# Patient Record
Sex: MALE | Race: WHITE | NOT HISPANIC OR LATINO | Employment: UNEMPLOYED | URBAN - METROPOLITAN AREA
[De-identification: names, ages, dates, MRNs, and addresses within clinical notes are randomized per-mention and may not be internally consistent; named-entity substitution may affect disease eponyms.]

---

## 2018-07-12 ENCOUNTER — TELEPHONE (OUTPATIENT)
Dept: PLASTIC SURGERY | Facility: CLINIC | Age: 23
End: 2018-07-12

## 2018-07-12 DIAGNOSIS — F64.0 GENDER DYSPHORIA IN ADULT: Primary | ICD-10-CM

## 2018-07-12 NOTE — TELEPHONE ENCOUNTER
Pt called inquiring about top surgery. Pt reports he is on hormones for 6 months with Reese Aguilar at [facility] in [place]; Pt sees Brice at [facility] for therapy who has written pt letter of support; Pt reports Reese will be sending medical records. Pt is looking for top surgery with Dr. Akins for periareolar mastectomy. Pt is in process of changing legal name and gender marker and will bring legal paperwork to his consultation appointment. Pt will bring and have Brice resend letter of support prior to consultation.     Plan: Writer submitted referral for gender care. Pt scheduled in [place] on 8/10/18 at 10am.

## 2018-07-25 ENCOUNTER — HEALTH MAINTENANCE LETTER (OUTPATIENT)
Age: 23
End: 2018-07-25

## 2018-08-02 ENCOUNTER — TRANSFERRED RECORDS (OUTPATIENT)
Dept: HEALTH INFORMATION MANAGEMENT | Facility: CLINIC | Age: 23
End: 2018-08-02

## 2018-08-06 ENCOUNTER — TRANSFERRED RECORDS (OUTPATIENT)
Dept: HEALTH INFORMATION MANAGEMENT | Facility: CLINIC | Age: 23
End: 2018-08-06

## 2018-08-10 ENCOUNTER — OFFICE VISIT (OUTPATIENT)
Dept: SURGERY | Facility: CLINIC | Age: 23
End: 2018-08-10
Attending: PLASTIC SURGERY
Payer: MEDICAID

## 2018-08-10 VITALS — WEIGHT: 216.9 LBS | BODY MASS INDEX: 38.43 KG/M2 | HEIGHT: 63 IN

## 2018-08-10 DIAGNOSIS — F64.0 GENDER DYSPHORIA IN ADULT: Primary | ICD-10-CM

## 2018-08-10 PROCEDURE — 99203 OFFICE O/P NEW LOW 30 MIN: CPT | Performed by: PLASTIC SURGERY

## 2018-08-10 RX ORDER — TESTOSTERONE CYPIONATE 200 MG/ML
50 INJECTION, SOLUTION INTRAMUSCULAR WEEKLY
COMMUNITY

## 2018-08-10 NOTE — PROGRESS NOTES
REFERRING PROVIDER: Quincy Singh    REASON FOR CONSULTATION: Gender dysphoria, requesting top surgery.    HPI: Patient is a 23-year-old trans-man who prefers he him pronouns. He has been considering undergoing top surgery for the past 2-3 years. By undergoing surgery he would like to better align his physical body with his chosen gender identity. He has no history of binding. He transitioned a year ago. He has been on hormones for the past 7 months, prescribed by Reese zimmer. He denies any previous breast medical history.    MEDS:   Prior to Admission medications    Medication Sig Start Date End Date Taking? Authorizing Provider   testosterone cypionate (DEPOTESTOTERONE) 200 MG/ML injection Inject 50 mg into the muscle every 14 days   Yes Reported, Patient       ALLERGIES: None. Allergic to bees.    PMH: Depression and anxiety.    PSH: None.    SH: Denies tobacco use. Works for a temp agency.    FH: Breast cancer, cervical cancer.    ROS: Denies chest pain, shortness of breath, MI, CVA, diabetes, DVT, PE, and bleeding disorders.    PHYSICAL EXAMINATION:  BMI 38.  General: No distress. Appears masculine.  On examination of the chest, patient has bilateral grade 3 ptosis. Pectoralis muscles intact bilaterally. There is scattered acne. Notch to nipple distance is 27 cm bilaterally. Areolar diameter is 5 cm bilaterally. Nipple to fold distance is 13 cm bilaterally. Base diameter is 13 cm bilaterally. There is no palpable breast mass. There is no nipple discharge.    ASSESSMENT: Gender dysphoria, requesting bilateral mastectomy.    PLAN: Patient received a letter of support. We will review this. I splinted the need for a baseline screening mammogram given the family history of breast cancer. Patient is a potential candidate for bilateral mastectomy with free nipple grafting as an outpatient at the surgery center. I explained the procedure in detail. I explained the risks to include bleeding, infection, injury to  surrounding structures, fluid collection, nipple or nipple graft loss, nipple sensory loss, change in nipple size, wound healing difficulties, contour deformity, dog ears, asymmetry, and need for revision surgery. Patient understands there is a higher risk of wound healing difficulties, contour deformity, asymmetry, and dogears with obesity. He accepts these associated risks and wishes to proceed with surgery. He will consider weight loss. We will wait for the mammogram results.    Total time spent with patient was 30 min of which greater than 50% was in counseling.

## 2018-08-10 NOTE — MR AVS SNAPSHOT
After Visit Summary   8/10/2018    Shaila Redd    MRN: [id number]           Patient Information     Date Of Birth          1995        Visit Information        Provider Department      8/10/2018 10:00 AM David Akins MD [facility]        Today's Diagnoses     Gender dysphoria in adult    -  1       Follow-ups after your visit        Who to contact     If you have questions or need follow up information about today's clinic visit or your schedule please contact [facility] directly at [phone].  Normal or non-critical lab and imaging results will be communicated to you by Metconnexhart, letter or phone within 4 business days after the clinic has received the results. If you do not hear from us within 7 days, please contact the clinic through Metconnexhart or phone. If you have a critical or abnormal lab result, we will notify you by phone as soon as possible.  Submit refill requests through Route4Me or call your pharmacy and they will forward the refill request to us. Please allow 3 business days for your refill to be completed.          Additional Information About Your Visit        MyChart Information     Route4Me gives you secure access to your electronic health record. If you see a primary care provider, you can also send messages to your care team and make appointments. If you have questions, please call your primary care clinic.  If you do not have a primary care provider, please call [phone] and they will assist you.      Route4Me is an electronic gateway that provides easy, online access to your medical records. With Route4Me, you can request a clinic appointment, read your test results, renew a prescription or communicate with your care team.     To access your existing account, please contact your [facility] or call [phone] for assistance.        Care EveryWhere ID     This is your Care EveryWhere ID. This could be used by  "other organizations to access your [place] medical records  [id number]        Your Vitals Were     Height BMI (Body Mass Index)                1.6 m (5' 2.99\") 38.43 kg/m2           Blood Pressure from Last 3 Encounters:   No data found for BP    Weight from Last 3 Encounters:   08/10/18 98.4 kg (216 lb 14.4 oz)              Today, you had the following     No orders found for display       Primary Care Provider    Reese Coe NP       No address on file        Equal Access to Services     SHANICE COYLE : Hadii aad ku hadasho Soomaali, waaxda luqadaha, qaybta kaalmada adeegyada, waxay idiin hayaan adeeg kharash la'valentinn . So [facility] [phone].    ATENCIÓN: Si habla español, tiene a land disposición servicios gratuitos de asistencia lingüística. Llame al [phone].    We comply with applicable federal civil rights laws and [place] laws. We do not discriminate on the basis of race, color, national origin, age, disability, sex, sexual orientation, or gender identity.            Thank you!     Thank you for choosing [facility]  for your care. Our goal is always to provide you with excellent care. Hearing back from our patients is one way we can continue to improve our services. Please take a few minutes to complete the written survey that you may receive in the mail after your visit with us. Thank you!             Your Updated Medication List - Protect others around you: Learn how to safely use, store and throw away your medicines at www.disposemymeds.org.          This list is accurate as of 8/10/18 10:32 AM.  Always use your most recent med list.                   Brand Name Dispense Instructions for use Diagnosis    testosterone cypionate 200 MG/ML injection    DEPOTESTOTERONE     Inject 50 mg into the muscle every 14 days          "

## 2018-08-10 NOTE — LETTER
8/10/2018         RE: Shaila Redd  [address]  [address]  [place] [address]        Dear Colleague,    Thank you for referring your patient, Shaila Redd, to the [facility]. Please see a copy of my visit note below.    REFERRING PROVIDER: Quincy Singh    REASON FOR CONSULTATION: Gender dysphoria, requesting top surgery.    HPI: Patient is a 23-year-old trans-man who prefers he him pronouns. He has been considering undergoing top surgery for the past 2-3 years. By undergoing surgery he would like to better align his physical body with his chosen gender identity. He has no history of binding. He transitioned a year ago. He has been on hormones for the past 7 months, prescribed by Reese zimmer. He denies any previous breast medical history.    MEDS:   Prior to Admission medications    Medication Sig Start Date End Date Taking? Authorizing Provider   testosterone cypionate (DEPOTESTOTERONE) 200 MG/ML injection Inject 50 mg into the muscle every 14 days   Yes Reported, Patient       ALLERGIES: None. Allergic to bees.    PMH: Depression and anxiety.    PSH: None.    SH: Denies tobacco use. Works for a temp agency.    FH: Breast cancer, cervical cancer.    ROS: Denies chest pain, shortness of breath, MI, CVA, diabetes, DVT, PE, and bleeding disorders.    PHYSICAL EXAMINATION:  BMI 38.  General: No distress. Appears masculine.  On examination of the chest, patient has bilateral grade 3 ptosis. Pectoralis muscles intact bilaterally. There is scattered acne. Notch to nipple distance is 27 cm bilaterally. Areolar diameter is 5 cm bilaterally. Nipple to fold distance is 13 cm bilaterally. Base diameter is 13 cm bilaterally. There is no palpable breast mass. There is no nipple discharge.    ASSESSMENT: Gender dysphoria, requesting bilateral mastectomy.    PLAN: Patient received a letter of support. We will review this. I splinted the need for a baseline screening mammogram given the family history of  breast cancer. Patient is a potential candidate for bilateral mastectomy with free nipple grafting as an outpatient at the surgery center. I explained the procedure in detail. I explained the risks to include bleeding, infection, injury to surrounding structures, fluid collection, nipple or nipple graft loss, nipple sensory loss, change in nipple size, wound healing difficulties, contour deformity, dog ears, asymmetry, and need for revision surgery. Patient understands there is a higher risk of wound healing difficulties, contour deformity, asymmetry, and dogears with obesity. He accepts these associated risks and wishes to proceed with surgery. He will consider weight loss. We will wait for the mammogram results.    Total time spent with patient was 30 min of which greater than 50% was in counseling.    Again, thank you for allowing me to participate in the care of your patient.        Sincerely,        David Akins MD

## 2018-08-10 NOTE — NURSING NOTE
Shaila Redd's goals for this visit include: top surgery   She requests these members of her care team be copied on today's visit information: no    PCP: Reese Coe    Referring Provider:  Quincy Singh  No address on file    There were no vitals taken for this visit.    Do you need any medication refills at today's visit? No    Eve Ansari LPN

## 2018-08-14 DIAGNOSIS — Z13.9 ENCOUNTER FOR SCREENING, UNSPECIFIED: Primary | ICD-10-CM

## 2018-08-14 DIAGNOSIS — Z12.31 VISIT FOR SCREENING MAMMOGRAM: Primary | ICD-10-CM

## 2018-08-16 ENCOUNTER — TRANSFERRED RECORDS (OUTPATIENT)
Dept: HEALTH INFORMATION MANAGEMENT | Facility: CLINIC | Age: 23
End: 2018-08-16

## 2018-08-28 ENCOUNTER — MEDICAL CORRESPONDENCE (OUTPATIENT)
Dept: HEALTH INFORMATION MANAGEMENT | Facility: CLINIC | Age: 23
End: 2018-08-28

## 2018-09-04 ENCOUNTER — MYC MEDICAL ADVICE (OUTPATIENT)
Dept: PLASTIC SURGERY | Facility: CLINIC | Age: 23
End: 2018-09-04

## 2018-09-07 ENCOUNTER — TELEPHONE (OUTPATIENT)
Dept: SURGERY | Facility: CLINIC | Age: 23
End: 2018-09-07

## 2018-09-18 ENCOUNTER — TELEPHONE (OUTPATIENT)
Dept: SURGERY | Facility: CLINIC | Age: 23
End: 2018-09-18

## 2018-09-27 ENCOUNTER — TELEPHONE (OUTPATIENT)
Dept: SURGERY | Facility: CLINIC | Age: 23
End: 2018-09-27

## 2018-09-27 NOTE — TELEPHONE ENCOUNTER
[place], called Luca, I was told this was denied, not everything received?  Need to follow up on guidelines, staff message to Quincy

## 2018-10-02 ENCOUNTER — MYC MEDICAL ADVICE (OUTPATIENT)
Dept: PLASTIC SURGERY | Facility: CLINIC | Age: 23
End: 2018-10-02

## 2018-10-17 NOTE — TELEPHONE ENCOUNTER
PT called back. Pt provided verbal consent to call Brice Carvalho at [facility], who wrote pts letter of support, which needs to be edited and include information about pt living in gender role for at least 12 months.     Pt reported he will soon have [employer] [place] starting 11/1/18.  Pt was instructed to call Lucia Bella after he receives his insurance information (writer will be unavailable until 11/19/18). Pt was informed we will wait to resubmit PA until new insurance is active.     Writer called Brice Carvalho's office and left message to call back regarding patients letter of support.

## 2018-10-26 ENCOUNTER — PATIENT OUTREACH (OUTPATIENT)
Dept: PLASTIC SURGERY | Facility: CLINIC | Age: 23
End: 2018-10-26

## 2018-10-26 NOTE — PROGRESS NOTES
[facility]:  Care Coordination Note     SITUATION   Shaila Redd is a 23 year old female who is receiving support for:  Clinic Care Coordination - Follow-up  .    BACKGROUND   Pt desiring mastectomy    ASSESSMENT     Surgery              CGC Assessment  Comprehensive Gender Care (CGC) Enrollment: Enrolled  Patient has a therapist: Yes  Name of therapist: Brice Gutiérrez MS, LMFT  Therapist's phone number: [phone]  Letter of support #1: Received  Letter #1 Date: 10/25/18  Surgery being considered: Yes  Mastectomy: Yes  Mammogram completed: Yes (8/16/18  [facility])          PLAN          Nursing Interventions:       Follow-up plan:  Mammogram and letter of support received.  Routed to Trans coordinator and Dr. Akins for review and PA.       Jackie Vernon

## 2018-11-12 ENCOUNTER — TELEPHONE (OUTPATIENT)
Dept: SURGERY | Facility: CLINIC | Age: 23
End: 2018-11-12

## 2018-11-21 ENCOUNTER — TELEPHONE (OUTPATIENT)
Dept: SURGERY | Facility: CLINIC | Age: 23
End: 2018-11-21

## 2018-11-21 ENCOUNTER — TELEPHONE (OUTPATIENT)
Dept: PLASTIC SURGERY | Facility: CLINIC | Age: 23
End: 2018-11-21

## 2018-11-21 DIAGNOSIS — F64.0 GENDER DYSPHORIA IN ADULT: Primary | ICD-10-CM

## 2018-11-21 RX ORDER — CEFAZOLIN SODIUM 1 G/50ML
1 INJECTION, SOLUTION INTRAVENOUS SEE ADMIN INSTRUCTIONS
Status: CANCELLED | OUTPATIENT
Start: 2018-11-21

## 2018-11-21 RX ORDER — CEFAZOLIN SODIUM 2 G/50ML
2 SOLUTION INTRAVENOUS
Status: CANCELLED | OUTPATIENT
Start: 2018-11-21

## 2018-11-21 NOTE — TELEPHONE ENCOUNTER
Received [facility] pre-determination approval -ref#[id number] for codes requested, time span 11/12/18-5/12/19

## 2018-11-28 NOTE — TELEPHONE ENCOUNTER
Patient returned call, advised he would like to move forward as soon as possible.    Called him back and left message that time is available 12/13/18.

## 2018-11-30 ENCOUNTER — TELEPHONE (OUTPATIENT)
Dept: PLASTIC SURGERY | Facility: CLINIC | Age: 23
End: 2018-11-30

## 2018-11-30 NOTE — TELEPHONE ENCOUNTER
Spoke with patient to schedule surgery with Dr David Akins    [facility]    Patient will have Pre-Op with 2/12/18 with TALHA Merritt for pre op H&P    Post-Op care appointment scheduled?  YES    Patient is aware a / is needed day of surgery.     Surgery packet was sent via [employer]ently, patient has my direct contact information for any further questions.

## 2018-12-03 NOTE — TELEPHONE ENCOUNTER
I have Shaila scheduled for Feb, but he had a question regarding a hotel or overnight stay at the time of surgery.  I told him I would reach out to get more info on this. He said it was something new.  Please advise.  Thanks!

## 2018-12-04 ENCOUNTER — TELEPHONE (OUTPATIENT)
Dept: SURGERY | Facility: CLINIC | Age: 23
End: 2018-12-04

## 2018-12-04 NOTE — TELEPHONE ENCOUNTER
I called and talked to patient, he was asking if he needed to plan to spend the night close to town after surgery.  But now has found out that mother and fiance will be taking care of him.  He has no more questions

## 2019-01-22 ENCOUNTER — TELEPHONE (OUTPATIENT)
Dept: PLASTIC SURGERY | Facility: CLINIC | Age: 24
End: 2019-01-22

## 2019-01-22 NOTE — TELEPHONE ENCOUNTER
[facility] Call Center    Phone Message    May a detailed message be left on voicemail: yes    Reason for Call: Other: Shaila received a voicemail from the clinic indicating that the pre-op and post op appoitment would have to be changed.  Please call Shaila to verify appointments.     Action Taken: Message routed to:  [facility] clinic coord surgery

## 2019-01-24 NOTE — TELEPHONE ENCOUNTER
[facility] Call Center    Phone Message    May a detailed message be left on voicemail: yes    Reason for Call: Other: Shaila was calling again to confirm the appointments for pre and post op.  Apparently Shaila recently received a call regarding the dates and times.  Needs to verify for work, as soon as possible.     Action Taken: Message routed to:  [facility] clinic coor surgery

## 2019-01-28 ENCOUNTER — PATIENT OUTREACH (OUTPATIENT)
Dept: PLASTIC SURGERY | Facility: CLINIC | Age: 24
End: 2019-01-28

## 2019-01-28 NOTE — PROGRESS NOTES
Pt called confused about surgery and pre/post op dates. Pt reported he received a voicemail but had trouble hearing what it was about. Pt called call center and reported he never heard back from his call.     Pts appointments were confirmed via [facility] and with RNCC Shaina Vernon.

## 2019-02-12 ENCOUNTER — OFFICE VISIT (OUTPATIENT)
Dept: PLASTIC SURGERY | Facility: CLINIC | Age: 24
End: 2019-02-12
Payer: COMMERCIAL

## 2019-02-12 DIAGNOSIS — F64.9 GENDER DYSPHORIA: Primary | ICD-10-CM

## 2019-02-12 NOTE — LETTER
2/12/2019       RE: Shaila Redd  [address]  [address]  [place] [address]     Dear Colleague,    Thank you for referring your patient, Shaila Redd, to the [facility] PLASTIC AND RECONSTRUCTIVE SURGERY at [facility]. Please see a copy of my visit note below.    Pt. comes into clinic today at the request of Dr. David Akins    This service provided today was under the supervising provider of the day Dr. Akins, who was available if needed.    Reason for visit: Pt is scheduled for bilateral mastectomy with nipple grafts on 2/21/19.    Instructions given on pre-op showering, drain care, nipple bolsters, incision care and ACE wrap compression.  Written instructions given and all questions answered.  Mammogram has been completed.  Pt did not get pre-op H & P, will schedule with primary provider prior to surgery.  Pt instructed that surgery will be cancelled without H & P.  Verbalized understanding.    Again, thank you for allowing me to participate in the care of your patient.      Sincerely,    Jackie Vernon RN

## 2019-02-13 NOTE — PROGRESS NOTES
Pt. comes into clinic today at the request of Dr. David Akins    This service provided today was under the supervising provider of the day Dr. Akins, who was available if needed.    Reason for visit: Pt is scheduled for bilateral mastectomy with nipple grafts on 2/21/19.    Instructions given on pre-op showering, drain care, nipple bolsters, incision care and ACE wrap compression.  Written instructions given and all questions answered.  Mammogram has been completed.  Pt did not get pre-op H & P, will schedule with primary provider prior to surgery.  Pt instructed that surgery will be cancelled without H & P.  Verbalized understanding.    Jackie Vernon, RN, BSN  Care Coordinator

## 2019-02-20 ENCOUNTER — ANESTHESIA EVENT (OUTPATIENT)
Dept: SURGERY | Facility: AMBULATORY SURGERY CENTER | Age: 24
End: 2019-02-20

## 2019-02-20 ASSESSMENT — LIFESTYLE VARIABLES: TOBACCO_USE: 0

## 2019-02-21 ENCOUNTER — HOSPITAL ENCOUNTER (OUTPATIENT)
Facility: AMBULATORY SURGERY CENTER | Age: 24
End: 2019-02-21
Attending: PLASTIC SURGERY
Payer: COMMERCIAL

## 2019-02-21 ENCOUNTER — ANESTHESIA (OUTPATIENT)
Dept: SURGERY | Facility: AMBULATORY SURGERY CENTER | Age: 24
End: 2019-02-21

## 2019-02-21 VITALS
HEIGHT: 63 IN | BODY MASS INDEX: 37.56 KG/M2 | TEMPERATURE: 97.9 F | DIASTOLIC BLOOD PRESSURE: 66 MMHG | RESPIRATION RATE: 16 BRPM | WEIGHT: 212 LBS | HEART RATE: 89 BPM | OXYGEN SATURATION: 98 % | SYSTOLIC BLOOD PRESSURE: 117 MMHG

## 2019-02-21 DIAGNOSIS — F64.0 GENDER DYSPHORIA IN ADULT: Primary | ICD-10-CM

## 2019-02-21 DEVICE — IMPLANTABLE DEVICE: Type: IMPLANTABLE DEVICE | Site: BREAST | Status: FUNCTIONAL

## 2019-02-21 RX ORDER — OXYCODONE HYDROCHLORIDE 5 MG/1
5-10 TABLET ORAL EVERY 6 HOURS PRN
Qty: 26 TABLET | Refills: 0 | Status: SHIPPED | OUTPATIENT
Start: 2019-02-21 | End: 2019-02-24

## 2019-02-21 RX ORDER — NALOXONE HYDROCHLORIDE 0.4 MG/ML
.1-.4 INJECTION, SOLUTION INTRAMUSCULAR; INTRAVENOUS; SUBCUTANEOUS
Status: DISCONTINUED | OUTPATIENT
Start: 2019-02-21 | End: 2019-02-22 | Stop reason: HOSPADM

## 2019-02-21 RX ORDER — ONDANSETRON 4 MG/1
4 TABLET, ORALLY DISINTEGRATING ORAL EVERY 30 MIN PRN
Status: DISCONTINUED | OUTPATIENT
Start: 2019-02-21 | End: 2019-02-22 | Stop reason: HOSPADM

## 2019-02-21 RX ORDER — PROPOFOL 10 MG/ML
INJECTION, EMULSION INTRAVENOUS PRN
Status: DISCONTINUED | OUTPATIENT
Start: 2019-02-21 | End: 2019-02-21

## 2019-02-21 RX ORDER — ONDANSETRON 2 MG/ML
INJECTION INTRAMUSCULAR; INTRAVENOUS PRN
Status: DISCONTINUED | OUTPATIENT
Start: 2019-02-21 | End: 2019-02-21

## 2019-02-21 RX ORDER — GABAPENTIN 300 MG/1
300 CAPSULE ORAL ONCE
Status: COMPLETED | OUTPATIENT
Start: 2019-02-21 | End: 2019-02-21

## 2019-02-21 RX ORDER — DEXAMETHASONE SODIUM PHOSPHATE 4 MG/ML
INJECTION, SOLUTION INTRA-ARTICULAR; INTRALESIONAL; INTRAMUSCULAR; INTRAVENOUS; SOFT TISSUE PRN
Status: DISCONTINUED | OUTPATIENT
Start: 2019-02-21 | End: 2019-02-21

## 2019-02-21 RX ORDER — ONDANSETRON 4 MG/1
4 TABLET, ORALLY DISINTEGRATING ORAL
Status: DISCONTINUED | OUTPATIENT
Start: 2019-02-21 | End: 2019-02-22 | Stop reason: HOSPADM

## 2019-02-21 RX ORDER — OXYCODONE HYDROCHLORIDE 5 MG/1
5 TABLET ORAL
Status: DISCONTINUED | OUTPATIENT
Start: 2019-02-21 | End: 2019-02-22 | Stop reason: HOSPADM

## 2019-02-21 RX ORDER — LIDOCAINE HYDROCHLORIDE 20 MG/ML
INJECTION, SOLUTION INFILTRATION; PERINEURAL PRN
Status: DISCONTINUED | OUTPATIENT
Start: 2019-02-21 | End: 2019-02-21

## 2019-02-21 RX ORDER — ONDANSETRON 2 MG/ML
4 INJECTION INTRAMUSCULAR; INTRAVENOUS EVERY 30 MIN PRN
Status: DISCONTINUED | OUTPATIENT
Start: 2019-02-21 | End: 2019-02-22 | Stop reason: HOSPADM

## 2019-02-21 RX ORDER — OXYCODONE HYDROCHLORIDE 5 MG/1
5 TABLET ORAL EVERY 4 HOURS PRN
Status: DISCONTINUED | OUTPATIENT
Start: 2019-02-21 | End: 2019-02-22 | Stop reason: HOSPADM

## 2019-02-21 RX ORDER — FLUMAZENIL 0.1 MG/ML
0.2 INJECTION, SOLUTION INTRAVENOUS
Status: DISCONTINUED | OUTPATIENT
Start: 2019-02-21 | End: 2019-02-21 | Stop reason: HOSPADM

## 2019-02-21 RX ORDER — SCOLOPAMINE TRANSDERMAL SYSTEM 1 MG/1
1 PATCH, EXTENDED RELEASE TRANSDERMAL
Status: DISCONTINUED | OUTPATIENT
Start: 2019-02-21 | End: 2019-02-22 | Stop reason: HOSPADM

## 2019-02-21 RX ORDER — SODIUM CHLORIDE, SODIUM LACTATE, POTASSIUM CHLORIDE, CALCIUM CHLORIDE 600; 310; 30; 20 MG/100ML; MG/100ML; MG/100ML; MG/100ML
INJECTION, SOLUTION INTRAVENOUS CONTINUOUS
Status: DISCONTINUED | OUTPATIENT
Start: 2019-02-21 | End: 2019-02-22 | Stop reason: HOSPADM

## 2019-02-21 RX ORDER — SCOLOPAMINE TRANSDERMAL SYSTEM 1 MG/1
1 PATCH, EXTENDED RELEASE TRANSDERMAL ONCE
Status: COMPLETED | OUTPATIENT
Start: 2019-02-21 | End: 2019-02-21

## 2019-02-21 RX ORDER — NALOXONE HYDROCHLORIDE 0.4 MG/ML
.1-.4 INJECTION, SOLUTION INTRAMUSCULAR; INTRAVENOUS; SUBCUTANEOUS
Status: DISCONTINUED | OUTPATIENT
Start: 2019-02-21 | End: 2019-02-21 | Stop reason: HOSPADM

## 2019-02-21 RX ORDER — ACETAMINOPHEN 325 MG/1
650 TABLET ORAL
Status: DISCONTINUED | OUTPATIENT
Start: 2019-02-21 | End: 2019-02-22 | Stop reason: HOSPADM

## 2019-02-21 RX ORDER — CEFAZOLIN SODIUM 2 G/50ML
2 SOLUTION INTRAVENOUS
Status: COMPLETED | OUTPATIENT
Start: 2019-02-21 | End: 2019-02-21

## 2019-02-21 RX ORDER — CEFAZOLIN SODIUM 1 G/50ML
1 SOLUTION INTRAVENOUS SEE ADMIN INSTRUCTIONS
Status: DISCONTINUED | OUTPATIENT
Start: 2019-02-21 | End: 2019-02-21 | Stop reason: HOSPADM

## 2019-02-21 RX ORDER — MEPERIDINE HYDROCHLORIDE 25 MG/ML
12.5 INJECTION INTRAMUSCULAR; INTRAVENOUS; SUBCUTANEOUS
Status: DISCONTINUED | OUTPATIENT
Start: 2019-02-21 | End: 2019-02-22 | Stop reason: HOSPADM

## 2019-02-21 RX ORDER — FENTANYL CITRATE 50 UG/ML
25-50 INJECTION, SOLUTION INTRAMUSCULAR; INTRAVENOUS
Status: DISCONTINUED | OUTPATIENT
Start: 2019-02-21 | End: 2019-02-21 | Stop reason: HOSPADM

## 2019-02-21 RX ORDER — ACETAMINOPHEN 325 MG/1
975 TABLET ORAL ONCE
Status: COMPLETED | OUTPATIENT
Start: 2019-02-21 | End: 2019-02-21

## 2019-02-21 RX ORDER — SODIUM CHLORIDE, SODIUM LACTATE, POTASSIUM CHLORIDE, CALCIUM CHLORIDE 600; 310; 30; 20 MG/100ML; MG/100ML; MG/100ML; MG/100ML
INJECTION, SOLUTION INTRAVENOUS CONTINUOUS
Status: DISCONTINUED | OUTPATIENT
Start: 2019-02-21 | End: 2019-02-21 | Stop reason: HOSPADM

## 2019-02-21 RX ORDER — HYDROXYZINE HYDROCHLORIDE 25 MG/1
25 TABLET, FILM COATED ORAL
Status: DISCONTINUED | OUTPATIENT
Start: 2019-02-21 | End: 2019-02-22 | Stop reason: HOSPADM

## 2019-02-21 RX ORDER — PROPOFOL 10 MG/ML
INJECTION, EMULSION INTRAVENOUS CONTINUOUS PRN
Status: DISCONTINUED | OUTPATIENT
Start: 2019-02-21 | End: 2019-02-21

## 2019-02-21 RX ORDER — MINERAL OIL
OIL (ML) MISCELLANEOUS PRN
Status: DISCONTINUED | OUTPATIENT
Start: 2019-02-21 | End: 2019-02-21 | Stop reason: HOSPADM

## 2019-02-21 RX ORDER — BUPIVACAINE HYDROCHLORIDE 2.5 MG/ML
INJECTION, SOLUTION EPIDURAL; INFILTRATION; INTRACAUDAL PRN
Status: DISCONTINUED | OUTPATIENT
Start: 2019-02-21 | End: 2019-02-21

## 2019-02-21 RX ORDER — LIDOCAINE 40 MG/G
CREAM TOPICAL
Status: DISCONTINUED | OUTPATIENT
Start: 2019-02-21 | End: 2019-02-21 | Stop reason: HOSPADM

## 2019-02-21 RX ADMIN — PROPOFOL 70 MG: 10 INJECTION, EMULSION INTRAVENOUS at 07:39

## 2019-02-21 RX ADMIN — ONDANSETRON 4 MG: 2 INJECTION INTRAMUSCULAR; INTRAVENOUS at 07:45

## 2019-02-21 RX ADMIN — BUPIVACAINE HYDROCHLORIDE 20 ML: 2.5 INJECTION, SOLUTION EPIDURAL; INFILTRATION; INTRACAUDAL at 06:55

## 2019-02-21 RX ADMIN — ACETAMINOPHEN 975 MG: 325 TABLET ORAL at 06:40

## 2019-02-21 RX ADMIN — CEFAZOLIN SODIUM 1 G: 2 SOLUTION INTRAVENOUS at 09:39

## 2019-02-21 RX ADMIN — PROPOFOL 200 MCG/KG/MIN: 10 INJECTION, EMULSION INTRAVENOUS at 07:37

## 2019-02-21 RX ADMIN — SODIUM CHLORIDE, SODIUM LACTATE, POTASSIUM CHLORIDE, CALCIUM CHLORIDE: 600; 310; 30; 20 INJECTION, SOLUTION INTRAVENOUS at 06:41

## 2019-02-21 RX ADMIN — ONDANSETRON 4 MG: 4 TABLET, ORALLY DISINTEGRATING ORAL at 11:01

## 2019-02-21 RX ADMIN — FENTANYL CITRATE 50 MCG: 50 INJECTION, SOLUTION INTRAMUSCULAR; INTRAVENOUS at 06:57

## 2019-02-21 RX ADMIN — DEXAMETHASONE SODIUM PHOSPHATE 4 MG: 4 INJECTION, SOLUTION INTRA-ARTICULAR; INTRALESIONAL; INTRAMUSCULAR; INTRAVENOUS; SOFT TISSUE at 07:45

## 2019-02-21 RX ADMIN — PROPOFOL 200 MG: 10 INJECTION, EMULSION INTRAVENOUS at 07:37

## 2019-02-21 RX ADMIN — FENTANYL CITRATE 50 MCG: 50 INJECTION, SOLUTION INTRAMUSCULAR; INTRAVENOUS at 07:56

## 2019-02-21 RX ADMIN — PROPOFOL 50 MG: 10 INJECTION, EMULSION INTRAVENOUS at 07:38

## 2019-02-21 RX ADMIN — FENTANYL CITRATE 50 MCG: 50 INJECTION, SOLUTION INTRAMUSCULAR; INTRAVENOUS at 07:37

## 2019-02-21 RX ADMIN — GABAPENTIN 300 MG: 300 CAPSULE ORAL at 06:40

## 2019-02-21 RX ADMIN — SCOLOPAMINE TRANSDERMAL SYSTEM 1 PATCH: 1 PATCH, EXTENDED RELEASE TRANSDERMAL at 12:30

## 2019-02-21 RX ADMIN — LIDOCAINE HYDROCHLORIDE 50 MG: 20 INJECTION, SOLUTION INFILTRATION; PERINEURAL at 07:37

## 2019-02-21 RX ADMIN — OXYCODONE HYDROCHLORIDE 5 MG: 5 TABLET ORAL at 10:49

## 2019-02-21 RX ADMIN — CEFAZOLIN SODIUM 2 G: 2 SOLUTION INTRAVENOUS at 07:45

## 2019-02-21 ASSESSMENT — MIFFLIN-ST. JEOR: SCORE: 1851.76

## 2019-02-21 NOTE — ANESTHESIA CARE TRANSFER NOTE
Patient: Shaila Redd    Procedure(s):  Bilateral Mastectomy with Free Nipple Grafting    Diagnosis: Gender Dysphoria  Diagnosis Additional Information: No value filed.    Anesthesia Type:   No value filed.     Note:  Airway :Room Air  Patient transferred to:PACU  Comments: Report to RN    97.3, 16, 94, 97%, 121/76Handoff Report: Identifed the Patient, Identified the Reponsible Provider, Reviewed the pertinent medical history, Discussed the surgical course, Reviewed Intra-OP anesthesia mangement and issues during anesthesia, Set expectations for post-procedure period and Allowed opportunity for questions and acknowledgement of understanding      Vitals: (Last set prior to Anesthesia Care Transfer)    CRNA VITALS  2/21/2019 1002 - 2/21/2019 1037      2/21/2019             Resp Rate (set):  10                Electronically Signed By: LIO Toro CRNA  February 21, 2019  10:37 AM

## 2019-02-21 NOTE — OP NOTE
DATE OF OPERATION: February 21, 2019    PREOPERATIVE DIAGNOSIS: Gender dysphoria.    POSTOPERATIVE DIAGNOSIS: Gender dysphoria.    PROCEDURES PERFORMED:   1.  Bilateral simple complete mastectomy.  2.  Bilateral nipple reconstruction with free nipple grafts, size 2 x 2 cm each.    SURGEON: David Akins MD    ASSISTANT: Jasper Churchill MD    ANESTHESIA: General with bilateral pectoralis blocks.    ESTIMATED BLOOD LOSS: 50 mL.    SPECIMENS: Bilateral breast tissue. Right breast tissue weight 689 g. Left breast tissue weight 785 g.    COMPLICATIONS: None.    DRAINS: Two 15 Yoruba drains, one in each chest.    IMPLANTS: None.    INDICATIONS: Patient is a 23-year-old trans-man who prefers he him pronouns.  He transitioned a year ago.  Has been on hormone therapy for 7 months.  He does not have history of binding his chest.  He received a letter of support for top surgery.  August 16, 2018 mammogram showed no evidence of malignancy.  Risks benefits and alternatives were discussed for bilateral mastectomy with free nipple graft reconstruction.  Patient accepted the associated risks and wished to proceed with surgery.    DESCRIPTION OF PROCEDURE: Informed consent was reviewed with patient and there were no further questions. The chest was then marked along the midline, bilateral mammary lines, inframammary folds, breast footprint, and the proposed superior breast incision. Patient then underwent bilateral pectoralis blocks with Anesthesia. Patient was then taken to the operating room and placed in a supine position. Preoperative antibiotics were given, bilateral lower leg SCD's were placed, and general anesthesia was obtained. All pressure points were padded and arms placed on arm boards. The chest was then prepped and draped in a sterile fashion. A time out was performed.    We started on the right side. 100 cc of 1:1,000,000 epinephrine solution was injected into the superior subcutaneous tissue for hemostasis and also to  delineate the breast capsule. A 2 x 2 cm nipple graft was excised sharply and stored in a saline moist gauze. The proposed incisions were made with a scalpel and carried down into the subcutaneous tissue with bovie cautery. From the inframammary fold incision, the breast tissue was lifted off the chest wall up to the preoperatively marked breast footprint using bovie cautery with care taken to leave the pectoralis fascia intact. Then through the superior incision the interval between the breast capsule and subcutaneous tissue was sharply dissected using facelift scissors. Dissection was carried medially to the sternum, superiorly to the clavicle, laterally to the axilla and lateral border of pectoralis, and inferiorly to the inframammary fold. The breast tissue was then removed in total to include its axillary tail of Geller and weighed. The inframammary fold was detached from the chest wall using bovie cautery. The incision was then temporarily closed with staples. The exact same procedure was then performed on the left side.    Patient was then transitioned to a sitting position. Contour was found to be flat and symmetric. New nipple locations were chosen just medial to the lateral border of the pectoralis muscles and oval shaped 2 x 1.5 cm nipples were marked. Symmetry of these were confirmed with notch to nipple and midline to nipple measurements. Patient was then placed back into a supine position. The new nipple markings were de epithelialized sharply. This created a 2 x 2 cm wound bed due to tension. The temporary staples were removed. 15 Syriac drains were placed, one in each chest pocket, entering through the hair bearing region of the axillae. These were sewn in. Hemostasis was achieved and wounds were irrigated. Hemostatic powder was applied to the wound beds. Incisions were closed in layers using Monocryl suture. Nipple grafts were aggressively thinned with a pair of scissors. These were then placed over  the de epithelialized areas with 5-0 fast absorbing gut running suture. Xeroform and mineral oil soaked cotton ball bolsters were placed over these with 3-0 chromic sutures. All counts were correct at the end of the case. A compression dressing was applied to the chest. Patient was then extubated, awakened, and transferred to the postoperative area in stable condition.    DISPOSITION: Home.

## 2019-02-21 NOTE — OR NURSING
Patient continues to feel nauseated with movement. Dr. Estrella notified, scopolamine patch ordered, sea band bracelets and aromatherapy also utilized.

## 2019-02-21 NOTE — ANESTHESIA PREPROCEDURE EVALUATION
Anesthesia Pre-Procedure Evaluation    Patient: Shaila Redd   MRN:     [id number] Gender:   male   Age:    23 year old :      1995        Preoperative Diagnosis: Gender Dysphoria   Procedure(s):  Bilateral Mastectomy with Free Nipple Grafting     No past medical history on file.   No past surgical history on file.       Anesthesia Evaluation     . Pt has had prior anesthetic. Type: General           ROS/MED HX    ENT/Pulmonary: Comment: Esotropia, diplopia     (-) tobacco use   Neurologic:  - neg neurologic ROS     Cardiovascular:  - neg cardiovascular ROS       METS/Exercise Tolerance:     Hematologic:  - neg hematologic  ROS       Musculoskeletal:  - neg musculoskeletal ROS       GI/Hepatic:  - neg GI/hepatic ROS       Renal/Genitourinary:  - ROS Renal section negative       Endo:  - neg endo ROS       Psychiatric:     (+) psychiatric history other (comment), depression and anxiety (Gender Dysphoria))      Infectious Disease:  - neg infectious disease ROS       Malignancy:      - no malignancy   Other:    - neg other ROS                     PHYSICAL EXAM:   Mental Status/Neuro: A/A/O   Airway: Facies: Feasible  Mallampati: I  Mouth/Opening: Full  TM distance: > 6 cm  Neck ROM: Full   Respiratory: Auscultation: CTAB     Resp. Rate: Normal     Resp. Effort: Normal      CV: Rhythm: Regular  Rate: Age appropriate  Heart: Normal Sounds   Comments:      Dental: Normal                  No results found for: WBC, HGB, HCT, PLT, CRP, SED, NA, POTASSIUM, CHLORIDE, CO2, BUN, CR, GLC, RACHELE, PHOS, MAG, ALBUMIN, PROTTOTAL, ALT, AST, GGT, ALKPHOS, BILITOTAL, BILIDIRECT, LIPASE, AMYLASE, TAMARA, PTT, INR, FIBR, TSH, T4, T3, HCG, HCGS, CKTOTAL, CKMB, TROPN    Preop Vitals  BP Readings from Last 3 Encounters:   No data found for BP    Pulse Readings from Last 3 Encounters:   No data found for Pulse      Resp Readings from Last 3 Encounters:   No data found for Resp    SpO2 Readings from Last 3 Encounters:   No data found for  "SpO2      Temp Readings from Last 1 Encounters:   No data found for Temp    Ht Readings from Last 1 Encounters:   08/10/18 1.6 m (5' 2.99\")      Wt Readings from Last 1 Encounters:   08/10/18 98.4 kg (216 lb 14.4 oz)    Estimated body mass index is 38.43 kg/m  as calculated from the following:    Height as of 8/10/18: 1.6 m (5' 2.99\").    Weight as of 8/10/18: 98.4 kg (216 lb 14.4 oz).     LDA:            Assessment:   ASA SCORE: 2       Documentation: H&P complete; Preop Testing complete; Consents complete   Proceeding: Proceed without further delay     Plan:   Anes. Type:  General; Regional     RA Details:  Pre Induction; SS; Exparel; FOR POSTOP PAIN CONTROL; L; R     RA-Location/Type: Plane Block (pectoral x2)   Pre-Induction: Midazolam IV; Opioid IV; Acetaminophen PO   Induction:  IV (Standard); Propofol   Airway: Oral ETT   Access/Monitoring: PIV   Maintenance: Balanced   Emergence: Procedure Site   Logistics: Same Day Surgery     Postop Pain/Sedation Strategy:  Standard-Options: Opioids PRN; Block SS; Exparel     PONV Management:  Adult Risk Factors:, Postop Opioids  Prevention: Ondansetron     CONSENT:      Blood Products: N/a       Comments for Plan/Consent:  24 yo for  Bilateral Mastectomy with Free Nipple Grafting (Bilateral Breast) under General/regional block                         Mervin Akins MD     Agree with anesthetic plan as outlined above    Lucas Estrella MD  Staff Anesthesiologist  [phone]  "

## 2019-02-21 NOTE — OR NURSING
Pt received bilateral pectoralis block with off label exparel. Pt received 1 mg versed and 50 mcg fentanyl IV. Pt tolerated procedure without immediate complication.

## 2019-02-21 NOTE — BRIEF OP NOTE
[facility]    Brief Operative Note    Pre-operative diagnosis: Gender Dysphoria  Post-operative diagnosis * No post-op diagnosis entered *  Procedure: Procedure(s):  Bilateral Mastectomy with Free Nipple Grafting  Surgeon: Surgeon(s) and Role:     * David Akins MD - Primary   Jasper Churchill - Resident assisting   Anesthesia: Combined General with Block   Estimated blood loss: 50ml  Drains: ARUNA x2   Specimens:   ID Type Source Tests Collected by Time Destination   A : Left breast tissue to go to [facility] 785 grams Tissue Breast, Left SURGICAL PATHOLOGY EXAM Leidy Velásquez, RN 2/21/2019  8:52 AM    B : Right breast tissue to go to [facility] 689 grams Tissue Breast, Right SURGICAL PATHOLOGY EXAM Leidy Velásquez, RN 2/21/2019  8:53 AM      Findings:   Bilateral excess breast tissues. .  Complications: None.  Implants: None.    Plan:  Discharge postop when meets criteria     Jasper Churchill MD  Plastic Surgery Resident

## 2019-02-21 NOTE — ANESTHESIA PROCEDURE NOTES
Peripheral Nerve Block Procedure Note  Date/Time: 2/21/2019 6:55 AM    Staff:     Anesthesiologist:  Lucas Estrella MD    Resident/CRNA:  Mervin Akins MD    Block performed by resident/CRNA in the presence of a teaching physician    Location: Pre-op  Procedure Start/Stop TImes:      2/21/2019 6:40 AM     2/21/2019 6:55 AM    patient identified, IV checked, site marked, risks and benefits discussed, informed consent, monitors and equipment checked, pre-op evaluation, at physician/surgeon's request and post-op pain management      Correct Patient: Yes      Correct Position: Yes      Correct Site: Yes      Correct Procedure: Yes      Correct Laterality:  Yes    Site Marked:  Yes  Procedure details:     Procedure:  Pectoralis    ASA:  2    Diagnosis:  Gender dysphoria    Laterality:  Bilateral    Position:  Sitting    Sterile Prep: chloraprep, mask and sterile gloves      Needle:  Short bevel    Needle gauge:  21    Needle length (inches):  4    Ultrasound: Yes      Ultrasound used to identify targeted nerve, plexus, or vascular structure and placed a needle adjacent to it      Permanent Image entered into patiient's record      Abnormal pain on injection: No      Blood Aspirated: No      Paresthesias:  No    Bleeding at site: No      Test dose negative for signs of intravascular injection: Yes      Bolus via:  Needle    Infusion Method:  Single Shot    Complications:  None  Assessment/Narrative:     Injection made incrementally with aspirations every (mL):  5

## 2019-02-21 NOTE — DISCHARGE INSTRUCTIONS
"[facility]  Home Care Following Anesthesia  For 24 hours after surgery:  1. Get plenty of rest.  A responsible adult must stay with you for at least 24 hours after you leave the surgery center.  2. Do not drive or use heavy equipment.  If you have weakness or tingling, don't drive or use heavy equipment until this feeling goes away.   3. Do not drink alcohol.   4. Avoid strenuous or risky activities.  Ask for help when climbing stairs.  5. You may feel lightheaded.  IF so, sit for a few minutes before standing.  Have someone help you get up.   6. If you have nausea (feel sick to your stomach): Drink only clear liquids such as apple juice, ginger ale, broth or 7-Up.  Rest may also help.  Be sure to drink enough fluids.  Move to a regular diet as you feel able.   7. You may have a slight fever.  Call the doctor if your fever is over 100 F (37.7 C) (taken under the tongue) or lasts longer than 24 hours.  8. You may have a dry mouth, a sore throat, muscle aches or trouble sleeping. These should go away after 24 hours.  9. Do not make important or legal decisions.        Today you received an Exparel block to numb the nerves near your surgery site.  This is a block using local anesthetic or \"numbing\" medication injected around the nerves to anesthetize or \"numb\" the area supplied by those nerves.  This block is injected into the muscle layer near your surgical site.  This medication may numb the location where you had surgery up to 72 hours.  If your surgical site is an arm or leg you should be careful with your affected limb, since it is possible to injure your limb without being aware of it due to the numbing.  Until full feeling returns, you should guard against bumping or hitting your limb, and avoid extreme hot or cold temperatures on the skin.  As the block wears off, the feeling will return as a tingling or prickly sensation near your surgical site.  You will experince more " discomfort from your incision as the feeling returns.  You may want to take a pain pill (a narcotic or Tylenol if this was prescribed by your surgeon) when you start to experience mild pain before the pain beomes more severe.  If your pain medications do not control your pain, you should notify your surgeon.    Tips for taking pain medications  To get the best pain relief possible, remember these points:    Take pain medications as directed, before pain becomes severe.    Pain medication can upset your stomach: taking it with food may help.    Constipation is a common side effect of pain medication. Drink plenty of  fluids.    Eat foods high in fiber. Take a stool softener if recommended by your doctor or pharmacist.    Do not drink alcohol, drive or operate machinery while taking pain medications.    Ask about other ways to control pain, such as with heat, ice or relaxation.    Tylenol/Acetaminophen Consumption  To help encourage the safe use of acetaminophen, the makers of TYLENOL  have lowered the maximum daily dose for single-ingredient Extra Strength TYLENOL  (acetaminophen) products sold in the [place] from 8 pills per day (4,000 mg) to 6 pills per day (3,000 mg). The dosing interval has also changed from 2 pills every 4-6 hours to 2 pills every 6 hours.    If you feel your pain relief is insufficient, you may take Tylenol/Acetaminophen in addition to your narcotic pain medication.     Be careful not to exceed 3,000 mg of Tylenol/Acetaminophen in a 24 hour period from all sources.    If you are taking extra strength Tylenol/acetaminophen (500 mg), the maximum dose is 6 tablets in 24 hours.    If you are taking regular strength acetaminophen (325 mg), the maximum dose is 9 tablets in 24 hours.    ***You received 975mg of Tylenol at 6:40am***    Call a doctor for any of the followin. Signs of infection (fever, growing tenderness at the surgery site, a large amount of drainage or bleeding, severe pain,  "foul-smelling drainage, redness, swelling).  2. It has been over 8 to 10 hours since surgery and you are still not able to urinate (pass water).  3. Headache for over 24 hours.    Your doctor is:  Dr. David Akins, Plastic Surgery: [phone]                 Or dial [phone] and ask for the resident on call for:  Plastics  For emergency care, call the:  [facility] Emergency Department: [phone] (TTY for hearing impaired: [phone])    Information about liposomal bupivacaine (Exparel)    What is Liposomal Bupivacaine?    Liposomal Bupivacaine is a numbing medication that can help you manage your pain after surgery.  This medication is similar to \"novacaine,\" which is often used by the dentist.  Liposomal bupivacaine is released slowly and can help control pain for up to 72 hours.    What is the purpose of Liposomal Bupivacaine?    To manage your pain after surgery    To help you sleep better, take deep breaths, walk more comfortable, and feel up to visiting with others    How is the procedure done?    Liposomal bupivacaine is a medication given by an injection.    It is usually given right before your surgery.  If this is the case, you will be awake or sedated, but you should experience minimal pain during the procedure.    For some people, the injection may be given at the very end of your surgery.  It all depends on the type of surgery and your situation.    The procedure usually takes about 5-15 minutes.  An ultrasound machine will help the anesthesiologist insert it in the right place or the surgeon will inject it under direct vision.     A needle is used to place the numbing medication under your skin.  It provides pain relief by numbing the tissue in the area where your surgeon will make the incision.    What can I expect?    You may experience numbness, tingling, or a feeling of heaviness around the area that was injected.    If you experience any of the follow symptoms IMMEDIATELY CALL THE " REGIONAL ANESTHESIA PAIN SERVICE:    Numbness or tingling occurs in areas other than around the injection site    Blurry vision    Ringing in your ears    A metallic taste in your mouth    PAGE: Dial [phone].  When prompted, enter the following 4-digit ID number:  [id number].  You will be prompted to enter your phone number; and then enter the # sign.  The clinician on call will call you back.    OR  CALL: Dial [phone].  Let the hospital  know that you are having a problem with a nerve block and that you would like to speak to the regional anesthesia pain service right away.    You should not receive any other type of numbing medication within 4 days after receiving liposomal bupivacaine unless your anesthesiologist approves.    Post Operative Instructions: Regional Anesthetic with Liposomal Bupivacaine for Chest and Abdominal Surgery  General Information:   Regional anesthesia is when local anesthetic or  numbing  medication is injected around the nerves to anesthetize or  numb  the area supplied by that set of nerves.     Types of Regional Blocks:  Transversus Abdominis Plane (TAP): A block injected beneath the covering of a muscle layer of the abdomen for abdominal surgery  Pectoral: A block injected near the breast for surgery on the breast and armpit  Paravertebral: A block injected in the back for surgery on the chest, ribs, and breast    Procedure:  The type of anesthesia your doctor used to numb your chest or abdomen will usually not wear off for 24-48 hours, but may last as long as 72 hours.     Diet:  There are no restrictions on your diet. You should drink plenty of fluids.     Discomfort:  You will have a tingling and prickly sensation in your chest or abdomen as the feeling begins to return. You can also expect some discomfort. The amount of discomfort is unpredictable, but if you have more pain than can be controlled with pain medication you should notify your physician.     Pain Medicine:    Begin taking your oral pain pills before bedtime and during the night to avoid a sudden onset of pain as part of the block wears off.  Do not engage in drinking, driving, or hazardous occupations while taking pain medication.       Caring for Your [place] Drain    You have been discharged with a [place] drainage tube. This tube drains fluid from your incision, helping prevent swelling and reducing the risk for infection. The tube is held in place by a few stitches. The drain will be removed when your doctor determines you no longer need it and when the amount of drainage decreases. The color and amount of fluid varies. Right after surgery, the fluid may be bright red and may become clearer over time.   Dressing:    Keep the skin around the tube dry.    If you have a dressing, change it every day.   o Wash your hands.  o Remove the old bandage. Do not use scissors-you may accidentally cut the tube.  o Check for any redness, swelling, drainage, or broken stitches. (Call your doctor with any of these findings).  o Wash your hands again.  o Wet a cotton swab (Q-tip) and clean around the incision and the tube site. Use normal saline solution (salt and water) or soap and water. Start at the tube site and move outward in a circular motion.   o Pat dry.  o Put a new bandage on the incision and tube site. Make the bandage large enough to cover the whole incision area.   o Tape the bandage in place.  o Throw out old materials and wash your hands.   Home Care:    Tape the tube to the skin below the bandage. Make sure to keep some slack in the tube to keep it from pulling out.     DO NOT sleep on the same side as the tube.    Secure the tube and bulb inside your clothing with a safety pin. This helps keep the tube from being pulled out.     Keep the bulb compressed at all times, except when you empty it.    Empty your drain at least twice a day. Empty it more often if the drain is full.   o Lift the opening of the  drain.  o Drain the fluid into a measuring cup.  o Record the amount of fluid each time you empty. Share the information with your doctor at your follow-up visit.   o Squeeze the bulb with your hands until you hear air coming out of the bulb.  o Close the opening.     Tape plastic wrap over the bandage and tube site when you shower.      Stripping  the tube helps keep blood clots from blocking the tube.                         ONLY DO THIS IF YOUR DOCTOR INSTRUCTED YOU TO DO SO!  o Hold the tubing where it leaves the skin with one hand. This keeps it from pulling on the skin.  o Pinch the tubing with the thumb and first finger of your other hand.   o Slowly and firmly pull your thumb and first finger down the tube (squeezing the tube between your fingers). Keep squeezing the tube as you run your fingers towards the bulb. If the pulling hurts or feels like it is coming out of the skin, STOP. Begin again more gently.  o Let go of the tubing with both hands. If the tube is still blocked, repeat these steps three or four times. Make sure that the bulb is compressed so it creates suction.  When to call your doctor:    New or increased pain around the tube    Redness, warmth, or swelling around the incision or tube    Drainage that is foul smelling    Vomiting    Fever over 101 F degrees    Fluid leaking around the tube    Incision seems not to be healing    Stitches become loose    The tube falls out    Drainage that changes from light pick to dark red    A sudden increase or decrease in the amount of drainage (over 30 ml).  Your drainage record:  Date Time Bulb 1: Amount of drainage (ml or cc) Bulb 2: Amount of drainage (ml or cc) Notes                                                                                    Scopolamine Patch- (Absorbed through the skin)    This medicine prevents nausea and vomiting caused by motion sickness or anesthesia.  The medicine is in a patch worn behind the ear.      Do NOT use the  Scopolamine Patch if you have glaucoma or are allergic to scopolamine.    How to Use This Medicine:    The patch is applied behind the ear.    Keep the patch dry to prevent it from falling off.  Limit contact with water (no bathing or swimming).      If the patch is loose or falls off throw it away.  You do not need to apply a new patch.    After you take off the patch or if it falls off, wash your hands and the area behind your ear with soap and water.      You can remove the patch tomorrow, or leave on for up to 3 days.    Only one patch should be used at any time.    How to Dispose of This Medicine:    Fold the used patch in half with the sticky sides together. Throw any used patch away so that children or pets cannot get to it. You will also need to throw away old patches after the expiration date has passed.    Keep all medicine away from children and never share your medicine with anyone.    Warnings While Using This Medicine:    This medicine can make you sleepy.  Avoid taking sleeping pills and other medicines that can make you sleepy while the patch is on.    Do not drink alcohol while the patch is on.    This medicine can cause temporary blurring and other vision problems if it comes in contact with the eyes.  This is not serious unless accompanied by eye pain and redness.     This medicine may cause problems with urination. If you have problems with urinating, remove the patch.  If you are unable to urinate, call your doctor.      This medicine may make you dizzy or drowsy. Avoid driving, using machines, or doing anything else that could be dangerous if the patch is on.    This medicine may make you sweat less and cause your body to get too hot. Be careful in hot weather or if you are exercising.    Make sure any doctor or dentist who treats you knows that you have the patch on. This medicine may affect the results of certain medical tests.    Skin burns have been reported at the patch site in several  patients wearing an aluminized transdermal system during a magnetic resonance imaging scan (MRI).  Since this patch contains aluminum, it is recommended to remove the patch if you are having an MRI.    Possible Side Effects While Using This Medicine:    Dry mouth    Drowsiness    Temporary blurring of vision and widening of the pupils    Call your doctor right away if you notice any of these side effects:    Allergic reaction: Itching or hives, swelling in your face or hands, swelling or tingling in your mouth or throat, chest tightness, trouble breathing.    Blurred vision that does not go away after the patch is removed    Confusion or memory loss    Fast,slow, or uneven heartbeat    Lightheadedness, dizziness, drowsiness, or fainting    Seeing, hearing, or feeling things that are not there    Restlessness    Severe eye pain    Trouble urinating    If you notice other side effects that you think are caused by this medicine, call your doctor immediately.

## 2019-02-21 NOTE — ANESTHESIA POSTPROCEDURE EVALUATION
Anesthesia POST Procedure Evaluation    Patient: Shaila Redd   MRN:     [id number] Gender:   male   Age:    23 year old :      1995        Preoperative Diagnosis: Gender Dysphoria   Procedure(s):  Bilateral Mastectomy with Free Nipple Grafting   Postop Comments: No value filed.       Anesthesia Type:  General, Regional    Reportable Event: NO     PAIN: Uncomplicated   Sign Out status: Comfortable, Well controlled pain     PONV: No PONV   Sign Out status:  No Nausea or Vomiting     Neuro/Psych: Uneventful perioperative course   Sign Out Status: Preoperative baseline; Age appropriate mentation     Airway/Resp.: Uneventful perioperative course   Sign Out Status: Non labored breathing, age appropriate RR; Resp. Status within EXPECTED Parameters     CV: Uneventful perioperative course   Sign Out status: Appropriate BP and perfusion indices; Appropriate HR/Rhythm     Disposition:   Sign Out in:  PACU  Disposition:  Phase II; Home  Recovery Course: Uneventful  Follow-Up: Not required           Last Anesthesia Record Vitals:  CRNA VITALS  2019 1002 - 2019 1102      2019             Resp Rate (set):  10          Last PACU/Preop Vitals:  Vitals:    19 1100 19 1115 19 1215   BP: 123/81 118/64 117/66   Pulse:      Resp: 20 18 16   Temp: 36.6  C (97.9  F) 36.6  C (97.9  F)    SpO2: 97% 98% 98%         Electronically Signed By: Lucas Estrella MD, 2019, 2:53 PM

## 2019-02-28 LAB — COPATH REPORT: NORMAL

## 2019-03-01 ENCOUNTER — OFFICE VISIT (OUTPATIENT)
Dept: WOUND CARE | Facility: CLINIC | Age: 24
End: 2019-03-01
Payer: COMMERCIAL

## 2019-03-01 DIAGNOSIS — Z87.890 STATUS POST GENDER REASSIGNMENT SURGERY: Primary | ICD-10-CM

## 2019-03-01 NOTE — LETTER
Return to Work  March 1, 2019         Patient: Shaila Redd  Physician: Sky Oliveros has been offering care for her partner Shaila Redd who underwent surgery.   She was with him from the surgery date February 21, 2019 until  March 1, 2019.      Electronically signed by Diana Logan RN

## 2019-03-01 NOTE — PATIENT INSTRUCTIONS
Post Transgender Mastectomy Instructions    May shower with water spray to your back for the first week.    Change dressing after showering or once daily.  Care of your new nipples:  1. Carefully remove the old dressing making sure it s not sticking to or lifting up the grafts. (if you feel it is sticking you can get it slightly damp by spraying the microklenz on the gauze to help it lift up).  2. Liberally spray a 4 x 4 gauze with microklenz spray then gently pat nipples with the wet gauze and allow to air dry.  Do Not Rub!  3. Cut a piece of adaptic (curity non adherent or Vaseline coated dressing) into 4 pieces.  (remember to save the leftover 2 pieces in a ziplock bag).  4. Place one piece of the cut adaptic over each nipple.  5. Fold a 2 x 2 gauze in half and place on top of the adaptic.  6. Carefully place the tegaderm protective cover over the top.  7. Change the dressing for the next 14 days.  8. Ace wrap for the next 14 days as well. (this is to help with any swelling).     The glue strip over your incision will start to peel up and fall off after about 2 weeks but if after 3 weeks the glue strip is still in place you may need to help it come off in the shower.    Post-surgery Nipple FAQ S    1) Once I'm no longer using nipple dressings or the Ace wrap, do I have to put anything on my nipples?   No you don't have to.  Remember it is ok if each side is behaving differently in healing.      2) When can I let the spray from my shower head hit my chest directly?  Should I be washing my chest with soap, or just rinsing it?   Once you are done with the nipple dressings you may shower like you normally do.  After you are done with the nipple dressings, you may wash with soap but treat the nipples gently, so no washcloth or anything else rough for 8 weeks     3) When can I start wearing shirts that pull over my head instead of buttoning/zipping in the front?   You can start wearing shirts that pull over the head  within around 3 to 4 weeks when you are no longer sore.      4) I forget what you said about how much I can lift and when?   You will be on a 5 pound lifting restriction for minimally 4 weeks.  Let your body be your guide, increase in 2 to 5lb increments. If frequent 50 pound lifting is typical, you can resume this again at 8 weeks from surgery     5) When can I start reaching my arms over my head?    You may start reaching above your head after 3 weeks though, remember to start gently.      6) When can I start swimming again?  You may start swimming or immersing in water completely after 6-8 weeks     7) I've been sleeping on my back, but I'm wondering when it's okay to sleep on my side.    You may start sleeping on your side again after 3 to 4 weeks again as tolerated.      8) When can I start to massage my scars?   You can start around 3 weeks post op.  Gently massage your scars and remember to move in the direction of the incision.     9) What will reduce the scar?   Scars will lighten with time, approximately in one year. Sun exposure however will darken them so if you are concerned use sunscreen on the scars. Other scar reducing products are unproven but okay to try if you want.  Scarring depends on genetics, tension on the tissues during activities.    There are various options none proven and none covered by insurance.   Silicone strips - oils - vitamin E - Mederma - Frankincense, etc.      10) When are the nipples done sloughing?   Old nipple skin will peel off when new graft underneath has healed usually around 2-3 weeks post op.     11) What happens if there is drainage?   Keep clean and dry cover with gauze and bandaid. Ask about topical medications.    Always call the nurse at [phone]  if you are concerned.

## 2019-03-01 NOTE — PROGRESS NOTES
Patient referred by Dr. Akins arrives s/p bilateral mastectomy for ARUNA drain removal. Pt states that the drains have decreased output and drainage is less than 20 cc. Pt has no signs or symptoms of infections and is healing well. Sutures removed from drains and drains removed without difficulty. Ace bandage applied and to keep this on for 3 week.   Pt did undergo a nipple graft.     Nipple graft care: Removed the bolster dressing. Cleansed with  MicroKlenz.  Patted  nipples dry with gauze.  Adaptic with gauze and tegaderm over each nipple.    Instructions given and printed.   Marlene Stephens NP was available for supervision of care if needed or if questions should arise and regarding plan of care. Diana Logan RN CWON

## 2019-03-01 NOTE — LETTER
Return to Work  March 1, 2019     Seen today: yes    Patient: Shaila Redd  Physician: Dr Sky Redd may return to work on Date: 03/08/2019.        Patient limitations:  He has a 5 pound lifting restriction for minimally 4 weeks (until March 21, 2019) increase in 2 to 5lb increments over the next 4 weeks. 50 pound lifting can be resumed this again at 8 weeks from surgery on April 25, 2019.            Electronically signed by Diana Logan RN

## 2019-03-25 ENCOUNTER — TELEPHONE (OUTPATIENT)
Dept: PLASTIC SURGERY | Facility: CLINIC | Age: 24
End: 2019-03-25

## 2019-03-25 NOTE — TELEPHONE ENCOUNTER
I called insurance about denial on line 2 for date of service 2/21/19-invoice #[id number] was sent back for review, call ref#[id number]

## 2020-02-26 ENCOUNTER — MEDICAL CORRESPONDENCE (OUTPATIENT)
Dept: HEALTH INFORMATION MANAGEMENT | Facility: CLINIC | Age: 25
End: 2020-02-26

## 2020-03-02 ENCOUNTER — TELEPHONE (OUTPATIENT)
Dept: PLASTIC SURGERY | Facility: CLINIC | Age: 25
End: 2020-03-02

## 2020-03-02 NOTE — TELEPHONE ENCOUNTER
3/2/2020 Reached out to pt to follow up on LOS and to do [facility] intake. LVM for pt to call back.    Thierry Moore  [facility]  Intake and Referral Coordinator

## 2020-03-10 ENCOUNTER — TELEPHONE (OUTPATIENT)
Dept: PLASTIC SURGERY | Facility: CLINIC | Age: 25
End: 2020-03-10

## 2020-03-10 NOTE — TELEPHONE ENCOUNTER
[facility]:  Care Coordination Note     SITUATION   Shaila Redd is a 24 year old adult who is receiving support for:  Clinic Care Coordination - Initial  .    BACKGROUND     Pt is interested in Phalloplasty   Pt is still awaiting 2nd LOS     Pt was referred to the [facility] to discuss hysterectomy consult options        ASSESSMENT     Surgery              [facility] Assessment  [facility] ([facility]) Enrollment: Enrolled  Patient has a therapist: Yes  Name of therapist: Briec Mon @ [facility]  Therapist's phone number: [phone]  Letter of support #1: Received  Letter #1 Date: 02/26/20  Letter of support #2: Requested  Surgery being considered: Yes  Phalloplasty: Yes  Hysterectomy completed: No  Hair removal completed: No          PLAN     Follow-up plan:  Pt to obtain and get 2nd LOS to [facility]     Pt to reach out to [facility] for hysterectomy consult    When LOS are obtained writer will schedule for consult with Dr. Mable Moore

## 2020-03-11 ENCOUNTER — HEALTH MAINTENANCE LETTER (OUTPATIENT)
Age: 25
End: 2020-03-11

## 2020-07-14 ENCOUNTER — OFFICE VISIT (OUTPATIENT)
Dept: OBGYN | Facility: CLINIC | Age: 25
End: 2020-07-14
Attending: STUDENT IN AN ORGANIZED HEALTH CARE EDUCATION/TRAINING PROGRAM
Payer: COMMERCIAL

## 2020-07-14 VITALS
HEART RATE: 94 BPM | BODY MASS INDEX: 36.31 KG/M2 | SYSTOLIC BLOOD PRESSURE: 120 MMHG | WEIGHT: 205 LBS | DIASTOLIC BLOOD PRESSURE: 86 MMHG

## 2020-07-14 DIAGNOSIS — F64.9 GENDER DYSPHORIA: Primary | ICD-10-CM

## 2020-07-14 PROCEDURE — G0463 HOSPITAL OUTPT CLINIC VISIT: HCPCS | Mod: ZF

## 2020-07-14 RX ORDER — CEFAZOLIN SODIUM 1 G/3ML
1 INJECTION, POWDER, FOR SOLUTION INTRAMUSCULAR; INTRAVENOUS SEE ADMIN INSTRUCTIONS
Status: CANCELLED | OUTPATIENT
Start: 2020-07-14

## 2020-07-14 RX ORDER — NEEDLES, DISPOSABLE 25GX1"
NEEDLE, DISPOSABLE MISCELLANEOUS
COMMUNITY
Start: 2020-01-22 | End: 2021-01-21

## 2020-07-14 RX ORDER — CEFAZOLIN SODIUM 2 G/100ML
2 INJECTION, SOLUTION INTRAVENOUS
Status: CANCELLED | OUTPATIENT
Start: 2020-07-14

## 2020-07-14 ASSESSMENT — ANXIETY QUESTIONNAIRES
2. NOT BEING ABLE TO STOP OR CONTROL WORRYING: SEVERAL DAYS
3. WORRYING TOO MUCH ABOUT DIFFERENT THINGS: SEVERAL DAYS
1. FEELING NERVOUS, ANXIOUS, OR ON EDGE: SEVERAL DAYS
5. BEING SO RESTLESS THAT IT IS HARD TO SIT STILL: SEVERAL DAYS
5. BEING SO RESTLESS THAT IT IS HARD TO SIT STILL: SEVERAL DAYS
7. FEELING AFRAID AS IF SOMETHING AWFUL MIGHT HAPPEN: NOT AT ALL
4. TROUBLE RELAXING: SEVERAL DAYS
6. BECOMING EASILY ANNOYED OR IRRITABLE: SEVERAL DAYS
6. BECOMING EASILY ANNOYED OR IRRITABLE: SEVERAL DAYS
GAD7 TOTAL SCORE: 7
GAD7 TOTAL SCORE: 7
IF YOU CHECKED OFF ANY PROBLEMS ON THIS QUESTIONNAIRE, HOW DIFFICULT HAVE THESE PROBLEMS MADE IT FOR YOU TO DO YOUR WORK, TAKE CARE OF THINGS AT HOME, OR GET ALONG WITH OTHER PEOPLE: SOMEWHAT DIFFICULT
1. FEELING NERVOUS, ANXIOUS, OR ON EDGE: SEVERAL DAYS
GAD7 TOTAL SCORE: 6
3. WORRYING TOO MUCH ABOUT DIFFERENT THINGS: SEVERAL DAYS
7. FEELING AFRAID AS IF SOMETHING AWFUL MIGHT HAPPEN: SEVERAL DAYS
7. FEELING AFRAID AS IF SOMETHING AWFUL MIGHT HAPPEN: SEVERAL DAYS
2. NOT BEING ABLE TO STOP OR CONTROL WORRYING: SEVERAL DAYS

## 2020-07-14 ASSESSMENT — ENCOUNTER SYMPTOMS
DEPRESSION: 1
PANIC: 1
INSOMNIA: 1
DECREASED CONCENTRATION: 1
NERVOUS/ANXIOUS: 1

## 2020-07-14 ASSESSMENT — PATIENT HEALTH QUESTIONNAIRE - PHQ9
SUM OF ALL RESPONSES TO PHQ QUESTIONS 1-9: 5
5. POOR APPETITE OR OVEREATING: SEVERAL DAYS

## 2020-07-14 ASSESSMENT — PAIN SCALES - GENERAL: PAINLEVEL: NO PAIN (0)

## 2020-07-14 NOTE — PROGRESS NOTES
[facility]  Gynecology Visit    CC: gender dysphoria, requesting hysterectomy     HPI:    Shaila Redd is a 25 year old male, P0, here for hysterectomy consult. Patient wants surgery because he would like to align his physical body with his gender identity. Patient interested in phalloplasty. Patient says presently he does not get menses since starting testosterone. He was late on injection two weeks ago and two days of light bleeding. Prior to starting testosterone, he had regular menses occurring monthly, lasting <7 days, light bleeding, no cramping. In regards to family planning patient is considering freezing eggs, but feels he most likely will not.   Pt has received previous gender affirming surgery and is very clear in their desire to transition.    Obstetrics History:  Never pregnant     Gynecologic History:  - LMP: No LMP recorded. (Menstrual status: Chemotherapy).  - Last Pap: NILM (2/27/18)  - S/p HPV vaccine   - Denies any history of abnormal pap smears  - Denies prior cervical surgery or procedures  - Denies any history of frequent UTIs, vaginal infections, or STIs  - Menses: see above  - Contraception: none, female partner  - Sexual Activity: one partner, female    Past Medical History:  Mild asthma     Past Surgical History:  Transgender bilateral mastectomy w/Free Nipple Grafting  [place] teeth     Current Medications:  Prior to Admission medications    Medication Sig Last Dose Taking? Auth Provider   testosterone cypionate (DEPOTESTOTERONE) 200 MG/ML injection Inject 50 mg into the muscle once a week  Past Week at Unknown time  Reported, Patient       Allergies:  Bee    Social History:   Former smoker, occasional alcohol use, no drug use  Currently unemployed due to COVID   Lives with wife in [place]     Family History:  Breast cancer- grandmother  Limited family history since pt was adopted    ROS:  10-point ROS negative except as in HPI       Physical Exam  /86   Pulse 94   Wt 93 kg  (205 lb)   Breastfeeding No   BMI 36.31 kg/m    Gen: Well-appearing, NAD  HEENT: Normocephalic, atraumatic  Neck: Thyroid is not enlarged, no appreciable masses palpated. Non-tender  CV:  RRR, no m/r/g auscultated  Pulm: CTAB, no w/r/r auscultated  Abd: Soft, non-tender, non-distended  Ext: No LE edema, extremities warm and well perfused    Pelvic Exam:  EG/BUS: Normal genital architecture without lesions, erythema or abnormal secretions Bartholin's, Urethra, [place]'s normal   Urethral meatus: normal   Urethra: no masses, tenderness, or scarring   Bladder: no masses or tenderness   Uterus: anteverted,  and small, smooth, firm, mobile w/o pain  Adnexa: Within normal limits and No masses, nodularity, tenderness  Rectum:anus normal     I have reviewed labs and imaging    Imaging:   Pelvic US 6/2019 (Care Everywhere)   - Uterus 4.6x6.3x2.8 cm  - Right ovary nml, Left ovary not seen     Assessment/Plan  Shaila Redd is a 25 year old male, P0 here for hysterectomy consult. Patient wants surgery because he would like to align his physical body with his gender identity. Discussed with patient modes of hysterectomy: WAYNE, TVH, TLH, RA-TLH, LAVH. Reviewed that with minimally invasive hysterectomy there is a risk of conversion to open hysterectomy. Based on exam uterus is small and mobile, patient would be good candidate for TLH. Discussed plan to remove cervix, uterus, fallopian tubes, and ovaries. Patient agreeable with this plan. He is going to continue taking testosterone. Considering freezing eggs, he will decide prior to surgery.  Reviewed risk of regret. Prior to surgery he will make a final decision regarding egg freezing. Understands after surgery he will not have ability to have biological children.  Discussed risks with surgery including infection, blood  Loss, and injury to surrounding structure. Discussed lifting restriction with recovery. After long discussion patient would like to proceed with TLH, bilateral  salpingo-oophorectomy.     1. Patient cleared from preoperative standpoint, discussed if surgery is >30 days from this visit he will need to see PCP for preop H&P  2. Reviewed COVID testing prior to surgery   3. Preoperative orders placed.     Patient staffed with Dr. Mayela Juarez MD  OB/GYN Resident, PGY-4  7/14/2020, 1:09 PM  I agree with note as above. The patient was seen in continuity clinic by the resident doctor.  Assessment and plan were jointly made.  Ekaterina Pedro MD

## 2020-07-14 NOTE — LETTER
7/14/2020       RE: Shaila Redd  [address]  [place] [address]     Dear Colleague,    Thank you for referring your patient, Shaila Redd, to the [facility] at [facility]. Please see a copy of my visit note below.    [facility]  Gynecology Visit    CC: gender dysphoria, requesting hysterectomy     HPI:    Shaila Redd is a 25 year old male, P0, here for hysterectomy consult. Patient wants surgery because he would like to align his physical body with his gender identity. Patient interested in phalloplasty. Patient says presently he does not get menses since starting testosterone. He was late on injection two weeks ago and two days of light bleeding. Prior to starting testosterone, he had regular menses occurring monthly, lasting <7 days, light bleeding, no cramping. In regards to family planning patient is considering freezing eggs, but feels he most likely will not.   Pt has received previous gender affirming surgery and is very clear in their desire to transition.    Obstetrics History:  Never pregnant     Gynecologic History:  - LMP: No LMP recorded. (Menstrual status: Chemotherapy).  - Last Pap: NILM (2/27/18)  - S/p HPV vaccine   - Denies any history of abnormal pap smears  - Denies prior cervical surgery or procedures  - Denies any history of frequent UTIs, vaginal infections, or STIs  - Menses: see above  - Contraception: none, female partner  - Sexual Activity: one partner, female    Past Medical History:  Mild asthma     Past Surgical History:  Transgender bilateral mastectomy w/Free Nipple Grafting  [place] teeth     Current Medications:  Prior to Admission medications    Medication Sig Last Dose Taking? Auth Provider   testosterone cypionate (DEPOTESTOTERONE) 200 MG/ML injection Inject 50 mg into the muscle once a week  Past Week at Unknown time  Reported, Patient       Allergies:  Bee    Social History:   Former smoker,  occasional alcohol use, no drug use  Currently unemployed due to COVID   Lives with wife in [place]     Family History:  Breast cancer- grandmother  Limited family history since pt was adopted    ROS:  10-point ROS negative except as in HPI       Physical Exam  /86   Pulse 94   Wt 93 kg (205 lb)   Breastfeeding No   BMI 36.31 kg/m    Gen: Well-appearing, NAD  HEENT: Normocephalic, atraumatic  Neck: Thyroid is not enlarged, no appreciable masses palpated. Non-tender  CV:  RRR, no m/r/g auscultated  Pulm: CTAB, no w/r/r auscultated  Abd: Soft, non-tender, non-distended  Ext: No LE edema, extremities warm and well perfused    Pelvic Exam:  EG/BUS: Normal genital architecture without lesions, erythema or abnormal secretions Bartholin's, Urethra, [place]'s normal   Urethral meatus: normal   Urethra: no masses, tenderness, or scarring   Bladder: no masses or tenderness   Uterus: anteverted,  and small, smooth, firm, mobile w/o pain  Adnexa: Within normal limits and No masses, nodularity, tenderness  Rectum:anus normal     I have reviewed labs and imaging    Imaging:   Pelvic US 6/2019 (Care Everywhere)   - Uterus 4.6x6.3x2.8 cm  - Right ovary nml, Left ovary not seen     Assessment/Plan  Shaila Redd is a 25 year old male, P0 here for hysterectomy consult. Patient wants surgery because he would like to align his physical body with his gender identity. Discussed with patient modes of hysterectomy: WAYNE, TVH, TLH, RA-TLH, LAVH. Reviewed that with minimally invasive hysterectomy there is a risk of conversion to open hysterectomy. Based on exam uterus is small and mobile, patient would be good candidate for TLH. Discussed plan to remove cervix, uterus, fallopian tubes, and ovaries. Patient agreeable with this plan. He is going to continue taking testosterone. Considering freezing eggs, he will decide prior to surgery.  Reviewed risk of regret. Prior to surgery he will make a final decision regarding egg freezing.  Understands after surgery he will not have ability to have biological children.  Discussed risks with surgery including infection, blood  Loss, and injury to surrounding structure. Discussed lifting restriction with recovery. After long discussion patient would like to proceed with TLH, bilateral salpingo-oophorectomy.     1. Patient cleared from preoperative standpoint, discussed if surgery is >30 days from this visit he will need to see PCP for preop H&P  2. Reviewed COVID testing prior to surgery   3. Preoperative orders placed.     Patient staffed with Dr. Mayela Juarez MD  OB/GYN Resident, PGY-4  7/14/2020, 1:09 PM  I agree with note as above. The patient was seen in continuity clinic by the resident doctor.  Assessment and plan were jointly made.  Ekaterina Pedro MD

## 2020-07-14 NOTE — PATIENT INSTRUCTIONS
It was a pleasure meeting you today. The plan is to schedule you for laparoscopic removal of uterus, cervix, ovaries, and fallopian tubes.     Please see primary care physician within 30 days prior to surgery for preoperative clearance    Complete COVID test prior to surgery, you will be contacted to schedule this.     Patient Education     Types of Laparoscopic Hysterectomy  There are several types of laparoscopic hysterectomy. Depending on your needs, all or part of the uterus may be removed. In some cases, the cervix, ovaries, or fallopian tubes are also removed. Your surgeon will discuss the options with you before surgery.    Total hysterectomy  A total hysterectomy means that the entire uterus is removed. It may be removed through the vagina. Or, it may be removed in pieces through small incisions in the abdomen.    Hysterectomy with removal of ovaries  In this procedure, the uterus, ovaries, and fallopian tubes are removed. The organs may be removed through the vagina or in pieces through small incisions in the abdomen.    Laparoscopic supracervical hysterectomy (LSH)  With this procedure, the top portion of the uterus is removed. The cervix is left in place and may be closed at top. This procedure may be done if the cervix is healthy. The uterus is removed in pieces through small incisions in the abdomen. The ovaries and tubes may be removed during this type of hysterectomy if desired.  Date Last Reviewed: 3/1/2017    [id number] [employer]. [address] [place] [address], [place] [address]. All rights reserved. This information is not intended as a substitute for professional medical care. Always follow your healthcare professional's instructions.

## 2020-07-14 NOTE — NURSING NOTE
Chief Complaint   Patient presents with     Establish Care     Hysterectomy consult   Elizabet Campbell LPN

## 2020-07-15 ENCOUNTER — TELEPHONE (OUTPATIENT)
Dept: OBGYN | Facility: CLINIC | Age: 25
End: 2020-07-15

## 2020-07-15 DIAGNOSIS — Z11.59 ENCOUNTER FOR SCREENING FOR OTHER VIRAL DISEASES: Primary | ICD-10-CM

## 2020-07-15 PROBLEM — F64.9 GENDER DYSPHORIA: Status: ACTIVE | Noted: 2020-07-15

## 2020-08-12 ENCOUNTER — TELEPHONE (OUTPATIENT)
Dept: OBGYN | Facility: CLINIC | Age: 25
End: 2020-08-12

## 2020-08-12 NOTE — TELEPHONE ENCOUNTER
lvm for patient, checking to see if patient would like sooner OR date, would like to offer 8/24/20 at 9:00a.m.

## 2020-08-21 DIAGNOSIS — Z11.59 ENCOUNTER FOR SCREENING FOR OTHER VIRAL DISEASES: ICD-10-CM

## 2020-08-21 PROCEDURE — U0003 INFECTIOUS AGENT DETECTION BY NUCLEIC ACID (DNA OR RNA); SEVERE ACUTE RESPIRATORY SYNDROME CORONAVIRUS 2 (SARS-COV-2) (CORONAVIRUS DISEASE [COVID-19]), AMPLIFIED PROBE TECHNIQUE, MAKING USE OF HIGH THROUGHPUT TECHNOLOGIES AS DESCRIBED BY CMS-2020-01-R: HCPCS | Performed by: OBSTETRICS & GYNECOLOGY

## 2020-08-23 LAB
SARS-COV-2 RNA SPEC QL NAA+PROBE: NOT DETECTED
SPECIMEN SOURCE: NORMAL

## 2020-08-24 ENCOUNTER — ANESTHESIA EVENT (OUTPATIENT)
Dept: SURGERY | Facility: CLINIC | Age: 25
End: 2020-08-24
Payer: COMMERCIAL

## 2020-08-24 ENCOUNTER — ANESTHESIA (OUTPATIENT)
Dept: SURGERY | Facility: CLINIC | Age: 25
End: 2020-08-24
Payer: COMMERCIAL

## 2020-08-24 ENCOUNTER — ANCILLARY PROCEDURE (OUTPATIENT)
Dept: ULTRASOUND IMAGING | Facility: CLINIC | Age: 25
End: 2020-08-24
Attending: ANESTHESIOLOGY
Payer: COMMERCIAL

## 2020-08-24 ENCOUNTER — HOSPITAL ENCOUNTER (OUTPATIENT)
Facility: CLINIC | Age: 25
Discharge: HOME | End: 2020-08-24
Attending: OBSTETRICS & GYNECOLOGY | Admitting: OBSTETRICS & GYNECOLOGY
Payer: COMMERCIAL

## 2020-08-24 VITALS
TEMPERATURE: 98.2 F | RESPIRATION RATE: 18 BRPM | HEIGHT: 63 IN | BODY MASS INDEX: 36.72 KG/M2 | DIASTOLIC BLOOD PRESSURE: 75 MMHG | HEART RATE: 84 BPM | SYSTOLIC BLOOD PRESSURE: 122 MMHG | OXYGEN SATURATION: 98 % | WEIGHT: 207.23 LBS

## 2020-08-24 DIAGNOSIS — F64.9 GENDER DYSPHORIA: ICD-10-CM

## 2020-08-24 DIAGNOSIS — Z90.710 S/P HYSTERECTOMY: Primary | ICD-10-CM

## 2020-08-24 LAB
ABO + RH BLD: NORMAL
ABO + RH BLD: NORMAL
B-HCG SERPL-ACNC: <1 IU/L
BLD GP AB SCN SERPL QL: NORMAL
BLOOD BANK CMNT PATIENT-IMP: NORMAL
GLUCOSE SERPL-MCNC: 88 MG/DL (ref 70–99)
HGB BLD-MCNC: 15.8 G/DL (ref 13.3–17.7)
SPECIMEN EXP DATE BLD: NORMAL

## 2020-08-24 PROCEDURE — 36415 COLL VENOUS BLD VENIPUNCTURE: CPT | Performed by: ANESTHESIOLOGY

## 2020-08-24 PROCEDURE — 37000009 ZZH ANESTHESIA TECHNICAL FEE, EACH ADDTL 15 MIN: Performed by: OBSTETRICS & GYNECOLOGY

## 2020-08-24 PROCEDURE — 36000062 ZZH SURGERY LEVEL 4 1ST 30 MIN - UMMC: Performed by: OBSTETRICS & GYNECOLOGY

## 2020-08-24 PROCEDURE — 86900 BLOOD TYPING SEROLOGIC ABO: CPT | Performed by: ANESTHESIOLOGY

## 2020-08-24 PROCEDURE — 85018 HEMOGLOBIN: CPT | Performed by: ANESTHESIOLOGY

## 2020-08-24 PROCEDURE — 71000014 ZZH RECOVERY PHASE 1 LEVEL 2 FIRST HR: Performed by: OBSTETRICS & GYNECOLOGY

## 2020-08-24 PROCEDURE — 27210794 ZZH OR GENERAL SUPPLY STERILE: Performed by: OBSTETRICS & GYNECOLOGY

## 2020-08-24 PROCEDURE — 71000015 ZZH RECOVERY PHASE 1 LEVEL 2 EA ADDTL HR: Performed by: OBSTETRICS & GYNECOLOGY

## 2020-08-24 PROCEDURE — 88307 TISSUE EXAM BY PATHOLOGIST: CPT | Performed by: OBSTETRICS & GYNECOLOGY

## 2020-08-24 PROCEDURE — 37000008 ZZH ANESTHESIA TECHNICAL FEE, 1ST 30 MIN: Performed by: OBSTETRICS & GYNECOLOGY

## 2020-08-24 PROCEDURE — 82947 ASSAY GLUCOSE BLOOD QUANT: CPT | Performed by: ANESTHESIOLOGY

## 2020-08-24 PROCEDURE — 25000125 ZZHC RX 250: Performed by: ANESTHESIOLOGY

## 2020-08-24 PROCEDURE — 25000128 H RX IP 250 OP 636: Performed by: ANESTHESIOLOGY

## 2020-08-24 PROCEDURE — 84702 CHORIONIC GONADOTROPIN TEST: CPT | Performed by: ANESTHESIOLOGY

## 2020-08-24 PROCEDURE — 36000064 ZZH SURGERY LEVEL 4 EA 15 ADDTL MIN - UMMC: Performed by: OBSTETRICS & GYNECOLOGY

## 2020-08-24 PROCEDURE — 25800030 ZZH RX IP 258 OP 636: Performed by: ANESTHESIOLOGY

## 2020-08-24 PROCEDURE — 25000125 ZZHC RX 250: Performed by: NURSE ANESTHETIST, CERTIFIED REGISTERED

## 2020-08-24 PROCEDURE — 88307 TISSUE EXAM BY PATHOLOGIST: CPT | Mod: 26 | Performed by: OBSTETRICS & GYNECOLOGY

## 2020-08-24 PROCEDURE — 86850 RBC ANTIBODY SCREEN: CPT | Performed by: ANESTHESIOLOGY

## 2020-08-24 PROCEDURE — 86901 BLOOD TYPING SEROLOGIC RH(D): CPT | Performed by: ANESTHESIOLOGY

## 2020-08-24 PROCEDURE — 25000128 H RX IP 250 OP 636: Performed by: OBSTETRICS & GYNECOLOGY

## 2020-08-24 PROCEDURE — 40000170 ZZH STATISTIC PRE-PROCEDURE ASSESSMENT II: Performed by: OBSTETRICS & GYNECOLOGY

## 2020-08-24 PROCEDURE — 25000566 ZZH SEVOFLURANE, EA 15 MIN: Performed by: OBSTETRICS & GYNECOLOGY

## 2020-08-24 PROCEDURE — 71000027 ZZH RECOVERY PHASE 2 EACH 15 MINS: Performed by: OBSTETRICS & GYNECOLOGY

## 2020-08-24 PROCEDURE — 25000128 H RX IP 250 OP 636: Performed by: NURSE ANESTHETIST, CERTIFIED REGISTERED

## 2020-08-24 RX ORDER — NALOXONE HYDROCHLORIDE 0.4 MG/ML
.1-.4 INJECTION, SOLUTION INTRAMUSCULAR; INTRAVENOUS; SUBCUTANEOUS
Status: DISCONTINUED | OUTPATIENT
Start: 2020-08-24 | End: 2020-08-24 | Stop reason: HOSPADM

## 2020-08-24 RX ORDER — DEXAMETHASONE SODIUM PHOSPHATE 4 MG/ML
INJECTION, SOLUTION INTRA-ARTICULAR; INTRALESIONAL; INTRAMUSCULAR; INTRAVENOUS; SOFT TISSUE PRN
Status: DISCONTINUED | OUTPATIENT
Start: 2020-08-24 | End: 2020-08-24

## 2020-08-24 RX ORDER — LIDOCAINE 40 MG/G
CREAM TOPICAL
Status: DISCONTINUED | OUTPATIENT
Start: 2020-08-24 | End: 2020-08-24 | Stop reason: HOSPADM

## 2020-08-24 RX ORDER — CEFAZOLIN SODIUM 1 G/3ML
1 INJECTION, POWDER, FOR SOLUTION INTRAMUSCULAR; INTRAVENOUS SEE ADMIN INSTRUCTIONS
Status: DISCONTINUED | OUTPATIENT
Start: 2020-08-24 | End: 2020-08-24 | Stop reason: HOSPADM

## 2020-08-24 RX ORDER — OXYCODONE HYDROCHLORIDE 5 MG/1
5 TABLET ORAL
Status: DISCONTINUED | OUTPATIENT
Start: 2020-08-24 | End: 2020-08-24 | Stop reason: HOSPADM

## 2020-08-24 RX ORDER — FENTANYL CITRATE 50 UG/ML
INJECTION, SOLUTION INTRAMUSCULAR; INTRAVENOUS PRN
Status: DISCONTINUED | OUTPATIENT
Start: 2020-08-24 | End: 2020-08-24

## 2020-08-24 RX ORDER — HYDROMORPHONE HYDROCHLORIDE 1 MG/ML
.3-.5 INJECTION, SOLUTION INTRAMUSCULAR; INTRAVENOUS; SUBCUTANEOUS EVERY 5 MIN PRN
Status: DISCONTINUED | OUTPATIENT
Start: 2020-08-24 | End: 2020-08-24 | Stop reason: HOSPADM

## 2020-08-24 RX ORDER — BUPIVACAINE HYDROCHLORIDE AND EPINEPHRINE 2.5; 5 MG/ML; UG/ML
INJECTION, SOLUTION INFILTRATION; PERINEURAL PRN
Status: DISCONTINUED | OUTPATIENT
Start: 2020-08-24 | End: 2020-08-24

## 2020-08-24 RX ORDER — ONDANSETRON 2 MG/ML
INJECTION INTRAMUSCULAR; INTRAVENOUS PRN
Status: DISCONTINUED | OUTPATIENT
Start: 2020-08-24 | End: 2020-08-24

## 2020-08-24 RX ORDER — ONDANSETRON 2 MG/ML
4 INJECTION INTRAMUSCULAR; INTRAVENOUS EVERY 30 MIN PRN
Status: DISCONTINUED | OUTPATIENT
Start: 2020-08-24 | End: 2020-08-24 | Stop reason: HOSPADM

## 2020-08-24 RX ORDER — AMOXICILLIN 250 MG
1-2 CAPSULE ORAL 2 TIMES DAILY
Qty: 30 TABLET | Refills: 0 | Status: SHIPPED | OUTPATIENT
Start: 2020-08-24 | End: 2020-09-17

## 2020-08-24 RX ORDER — FENTANYL CITRATE 50 UG/ML
50-100 INJECTION, SOLUTION INTRAMUSCULAR; INTRAVENOUS
Status: COMPLETED | OUTPATIENT
Start: 2020-08-24 | End: 2020-08-24

## 2020-08-24 RX ORDER — OXYCODONE HYDROCHLORIDE 5 MG/1
5 TABLET ORAL EVERY 6 HOURS PRN
Qty: 12 TABLET | Refills: 0 | Status: SHIPPED | OUTPATIENT
Start: 2020-08-24 | End: 2020-08-27

## 2020-08-24 RX ORDER — ACETAMINOPHEN 325 MG/1
650 TABLET ORAL
Status: DISCONTINUED | OUTPATIENT
Start: 2020-08-24 | End: 2020-08-24 | Stop reason: HOSPADM

## 2020-08-24 RX ORDER — LIDOCAINE HYDROCHLORIDE 20 MG/ML
INJECTION, SOLUTION INFILTRATION; PERINEURAL PRN
Status: DISCONTINUED | OUTPATIENT
Start: 2020-08-24 | End: 2020-08-24

## 2020-08-24 RX ORDER — PROPOFOL 10 MG/ML
INJECTION, EMULSION INTRAVENOUS PRN
Status: DISCONTINUED | OUTPATIENT
Start: 2020-08-24 | End: 2020-08-24

## 2020-08-24 RX ORDER — OXYCODONE HYDROCHLORIDE 5 MG/1
5 TABLET ORAL EVERY 4 HOURS PRN
Status: DISCONTINUED | OUTPATIENT
Start: 2020-08-24 | End: 2020-08-24 | Stop reason: HOSPADM

## 2020-08-24 RX ORDER — KETOROLAC TROMETHAMINE 30 MG/ML
INJECTION, SOLUTION INTRAMUSCULAR; INTRAVENOUS PRN
Status: DISCONTINUED | OUTPATIENT
Start: 2020-08-24 | End: 2020-08-24

## 2020-08-24 RX ORDER — FENTANYL CITRATE 50 UG/ML
25-50 INJECTION, SOLUTION INTRAMUSCULAR; INTRAVENOUS
Status: DISCONTINUED | OUTPATIENT
Start: 2020-08-24 | End: 2020-08-24 | Stop reason: HOSPADM

## 2020-08-24 RX ORDER — ONDANSETRON 4 MG/1
4 TABLET, ORALLY DISINTEGRATING ORAL EVERY 30 MIN PRN
Status: DISCONTINUED | OUTPATIENT
Start: 2020-08-24 | End: 2020-08-24 | Stop reason: HOSPADM

## 2020-08-24 RX ORDER — SODIUM CHLORIDE, SODIUM LACTATE, POTASSIUM CHLORIDE, CALCIUM CHLORIDE 600; 310; 30; 20 MG/100ML; MG/100ML; MG/100ML; MG/100ML
INJECTION, SOLUTION INTRAVENOUS CONTINUOUS
Status: DISCONTINUED | OUTPATIENT
Start: 2020-08-24 | End: 2020-08-24 | Stop reason: HOSPADM

## 2020-08-24 RX ORDER — BUPIVACAINE HYDROCHLORIDE 2.5 MG/ML
INJECTION, SOLUTION EPIDURAL; INFILTRATION; INTRACAUDAL PRN
Status: DISCONTINUED | OUTPATIENT
Start: 2020-08-24 | End: 2020-08-24

## 2020-08-24 RX ORDER — CEFAZOLIN SODIUM 2 G/100ML
2 INJECTION, SOLUTION INTRAVENOUS
Status: COMPLETED | OUTPATIENT
Start: 2020-08-24 | End: 2020-08-24

## 2020-08-24 RX ORDER — DEXAMETHASONE SODIUM PHOSPHATE 10 MG/ML
INJECTION, SOLUTION INTRAMUSCULAR; INTRAVENOUS PRN
Status: DISCONTINUED | OUTPATIENT
Start: 2020-08-24 | End: 2020-08-24

## 2020-08-24 RX ADMIN — HYDROMORPHONE HYDROCHLORIDE 0.5 MG: 1 INJECTION, SOLUTION INTRAMUSCULAR; INTRAVENOUS; SUBCUTANEOUS at 11:23

## 2020-08-24 RX ADMIN — FENTANYL CITRATE 50 MCG: 50 INJECTION, SOLUTION INTRAMUSCULAR; INTRAVENOUS at 10:24

## 2020-08-24 RX ADMIN — DEXAMETHASONE SODIUM PHOSPHATE 3 MG: 10 INJECTION, SOLUTION INTRAMUSCULAR; INTRAVENOUS at 08:51

## 2020-08-24 RX ADMIN — HYDROMORPHONE HYDROCHLORIDE 0.5 MG: 1 INJECTION, SOLUTION INTRAMUSCULAR; INTRAVENOUS; SUBCUTANEOUS at 10:44

## 2020-08-24 RX ADMIN — BUPIVACAINE HYDROCHLORIDE AND EPINEPHRINE BITARTRATE 30 ML: 2.5; .005 INJECTION, SOLUTION INFILTRATION; PERINEURAL at 08:51

## 2020-08-24 RX ADMIN — KETOROLAC TROMETHAMINE 30 MG: 30 INJECTION, SOLUTION INTRAMUSCULAR at 11:54

## 2020-08-24 RX ADMIN — LIDOCAINE HYDROCHLORIDE 100 MG: 20 INJECTION, SOLUTION INFILTRATION; PERINEURAL at 09:31

## 2020-08-24 RX ADMIN — HYDROMORPHONE HYDROCHLORIDE 0.3 MG: 1 INJECTION, SOLUTION INTRAMUSCULAR; INTRAVENOUS; SUBCUTANEOUS at 13:05

## 2020-08-24 RX ADMIN — ROCURONIUM BROMIDE 50 MG: 10 INJECTION INTRAVENOUS at 09:31

## 2020-08-24 RX ADMIN — MIDAZOLAM 1 MG: 1 INJECTION INTRAMUSCULAR; INTRAVENOUS at 08:41

## 2020-08-24 RX ADMIN — ROCURONIUM BROMIDE 10 MG: 10 INJECTION INTRAVENOUS at 10:11

## 2020-08-24 RX ADMIN — ONDANSETRON 4 MG: 2 INJECTION INTRAMUSCULAR; INTRAVENOUS at 13:40

## 2020-08-24 RX ADMIN — FENTANYL CITRATE 50 MCG: 50 INJECTION, SOLUTION INTRAMUSCULAR; INTRAVENOUS at 10:16

## 2020-08-24 RX ADMIN — FENTANYL CITRATE 100 MCG: 50 INJECTION, SOLUTION INTRAMUSCULAR; INTRAVENOUS at 09:32

## 2020-08-24 RX ADMIN — SUGAMMADEX 200 MG: 100 INJECTION, SOLUTION INTRAVENOUS at 11:37

## 2020-08-24 RX ADMIN — FENTANYL CITRATE 50 MCG: 50 INJECTION, SOLUTION INTRAMUSCULAR; INTRAVENOUS at 08:41

## 2020-08-24 RX ADMIN — SODIUM CHLORIDE, POTASSIUM CHLORIDE, SODIUM LACTATE AND CALCIUM CHLORIDE: 600; 310; 30; 20 INJECTION, SOLUTION INTRAVENOUS at 09:18

## 2020-08-24 RX ADMIN — ONDANSETRON 4 MG: 2 INJECTION INTRAMUSCULAR; INTRAVENOUS at 11:27

## 2020-08-24 RX ADMIN — PROPOFOL 150 MG: 10 INJECTION, EMULSION INTRAVENOUS at 09:31

## 2020-08-24 RX ADMIN — BUPIVACAINE HYDROCHLORIDE 30 ML: 2.5 INJECTION, SOLUTION EPIDURAL; INFILTRATION; INTRACAUDAL at 08:51

## 2020-08-24 RX ADMIN — CEFAZOLIN 2 G: 10 INJECTION, POWDER, FOR SOLUTION INTRAVENOUS at 09:36

## 2020-08-24 RX ADMIN — DEXAMETHASONE SODIUM PHOSPHATE 6 MG: 4 INJECTION, SOLUTION INTRAMUSCULAR; INTRAVENOUS at 11:27

## 2020-08-24 ASSESSMENT — MIFFLIN-ST. JEOR: SCORE: 1820

## 2020-08-24 NOTE — OP NOTE
Operative Note  Total Laparoscopic Hysterectomy  8/24/2020    [facility]  Full Operative Note    Date of Service:  8/24/2020    Surgeon: Mary Martinez MD  Assistants:   Taya Davis MD PGY4  Mo Bonner MD PGY1    Preop Dx:   Gender Dysphoria    Postop Dx:   Same as above    Procedure: Total laparoscopic hysterectomy, bilateral salpingo-oophorectomy, cystoscopy    Anesthesia: General anesthesia, TAP block   IVF: 700 mL crystalloid  EBL: 25 mL  UOP: 400 mL clear urine at the end of the case  Drains: none  Specimens: Uterus, cervix, bilateral fallopian tubes and ovaries  Complications: None apparent    Indications: Shaila Redd is a 25 year old transgender male (he/him) with gender dysphoria and desire for hysterectomy. The risks, benefits, and alternatives to the procedure were discussed and he agreed to proceed.    Findings: EUA revealed small, mobile anteverted uterus. Small cervix. Easily dilated and sounded to 6cm. No trauma on laparoscopic entry. Smooth liver edges and diaphragms. Did not visualize the appendix. Minimal adhesions of omentum and mesentery at sigmoid colon to left pelvic brim. Small, normal appearing uterus, healthy appearing bilateral fallopian tubes and ovaries. Ureters noted to vermiculate below the surgical field bilaterally before and at end of procedure. Cystoscopy revealed no bladder injury and brisk bilateral ureteral efflux. Hemostatic vaginal cuff at end of case.    Procedure:  After obtaining informed consent, Shaila was brought to the operating room where adequate general anesthesia was administered.  He was placed in the dorsal lithotomy position, and exam under anesthesia revealed the findings noted above. He was prepped and draped in the usual sterile fashion, and christie catheter was placed. The umbilicus was everted, and a 5 mm stab incision was made. The Veress needle was placed, and intraperitoneal placement was suggested by the water drop  test and low opening pressure.  The abdomen was then insufflated to a maximum pressure of 15 mmHg.  The Veress needle was removed and a 5 mm long trocar was placed.  The laparoscope was placed, and survey of the abdomen revealed the findings noted above, there was a small amount of pneumoperitoneum noted. No trauma from entry was noted. Attention was turned to the right lower quadrant. At a point 2 cm superomedial to the ASIS, a 12 mm incision was made, and a 10 mm trocar was placed under direct visualization.  Attention was then turned to the left lower quadrant. Similarly, at a point 2 cm superomedial to the ASIS, a 5 mm incision was made, and a 5 mm trocar was placed under direct visualization.  The patient was placed in steep Trendelenburg position.    Attention was turned to the vagina.  The cervix was visualized and serially dilated without difficulty.  A NovoED uterine manipulator was placed.      At this point, the right ureter was identified transperitoneally, and noted to peristalse. The infundibulopelvic ligament was identified and grasped, coagulated and transected with the LigaSure. The right mesosalpinx was then coagulated and transected to the level of the cornua. The round ligament was then clamped, coagulated and transected. A bladder flap was then started by dissecting the vesicouterine peritoneum. The right uterine arteries were then skeletonized.     The same was performed on the left. First the ureter was identified. The sigmoid adhesions were taken down with blunt and sharp dissection in order to identify the IP ligament above the ureter. The IP ligament was then cauterized and transected and this dissection was extended to the level of the cornua. The round ligament was then taken and the bladder flap was finished from the left side. The left uterine vessels were then skeletonized, coagulated and transected. Similarly the right uterine arteries were ligated with the LigaSure. The colpotomy was  performed with monopolar scissors and the uterus was then amputated at the level of the vaginal cuff along the Vcare ring. The specimen was removed through the vagina. The vaginal cuff was then closed in a running fashion using VLoc suture on the EndoStitch device.    The christie catheter was then removed and the cystoscope was placed in the bladder. Efflux was noted from bilateral ureteral orifices. A complete survey of the bladder revealed no injury. Final inspection of the abdomen revealed bilateral ureteral efflux and hemostasis at the vaginal cuff and bilateral infundibulopelvic ligaments.    The 12 mm fascial incision was closed with a 0 Vicryl suture using the Celso-Sania device.  All skin incisions were closed using 4-0 Vicryl, and covered with skin glue.     All sponge, needle, and instrument counts were correct. The patient tolerated the procedure well and was transferred to recovery in stable condition. Dr. Martinez was present and scrubbed for the entire procedure.    Taya Davis MD  Obstetrics and Gyncology, PGY-4  August 24, 2020 , 12:44 PM      I was present and scrubbed for entire procedure.   Mary Martinez MD

## 2020-08-24 NOTE — DISCHARGE INSTRUCTIONS
"Discharge Instructions:   Following a Laparoscopy    Comfort:    The amount of discomfort you can expect is very unpredictable.     If you have pain that cannot be controlled with non aspirin medication or with the prescription medication you may have received, you should notify your physician.     You May Experience:    Abdominal tenderness; abdominal cramps (like menstrual cramps).    Low back ache or discomfort radiating to your shoulders, chest, back or neck. This is a result of the gas used to inflate your abdomen during surgery. This gas is absorbed in 24 to 36 hours. The \"knee chest\" position will help relieve this discomfort.    Sore throat for a day or two resulting from the anesthesia tube used during surgery. You may use throat lozenges to help relieve this discomfort.    Black and blue marks on your abdomen.    Drainage:    You may expect a small amount of drainage from the incision on your abdomen and you may change the bandage when necessary.    You may also have a small amount of vaginal drainage for 3 to 4 days; this is normal and no cause for concern. If excessive bleeding occurs, notify your physician.    Do not douche, and use a pad rather than tampons. Do not resume intercourse for at least one week or until bleeding has ceased.    Home Activity:    The day of surgery spend a quiet day at home.    Increase activity as tolerated.    You may bathe or shower, do not soak in bath tub or scrub incisions.    You have no restrictions on your diet. Following surgery, drink plenty of fluids and eat a light meal.    The anesthesia may produce some nausea. If you feel nauseated, stay in bed, keep your head down and try drinking fluids such as Seven-Up, tea or soup.    Notify Physician at Once IF:    You have a fever over 100.4 degrees. A low grade fever (under 100 degrees) is usual after surgery.    You have severe pain.    You have a large amount of bleeding or drainage.    Important numbers  [facility] " "[facility]'s [facility] ([address]) - [place]: [phone]   [facility] ([address]) : [phone]    Rev. 4/2014Discharge Instructions:   Following a Laparoscopy    Comfort:    The amount of discomfort you can expect is very unpredictable.     If you have pain that cannot be controlled with non aspirin medication or with the prescription medication you may have received, you should notify your physician.     You May Experience:    Abdominal tenderness; abdominal cramps (like menstrual cramps).    Low back ache or discomfort radiating to your shoulders, chest, back or neck. This is a result of the gas used to inflate your abdomen during surgery. This gas is absorbed in 24 to 36 hours. The \"knee chest\" position will help relieve this discomfort.    Sore throat for a day or two resulting from the anesthesia tube used during surgery. You may use throat lozenges to help relieve this discomfort.    Black and blue marks on your abdomen.    Drainage:    You may expect a small amount of drainage from the incision on your abdomen and you may change the bandage when necessary.    You may also have a small amount of vaginal drainage for 3 to 4 days; this is normal and no cause for concern. If excessive bleeding occurs, notify your physician.    Do not douche, and use a pad rather than tampons. Do not resume intercourse for at least one week or until bleeding has ceased.    Home Activity:    The day of surgery spend a quiet day at home.    Increase activity as tolerated.    You may bathe or shower, do not soak in bath tub or scrub incisions.    You have no restrictions on your diet. Following surgery, drink plenty of fluids and eat a light meal.    The anesthesia may produce some nausea. If you feel nauseated, stay in bed, keep your head down and try drinking fluids such as Seven-Up, tea or soup.    Notify Physician at Once IF:    You have a fever over 100.4 degrees. A low grade fever (under 100 " degrees) is usual after surgery.    You have severe pain.    You have a large amount of bleeding or drainage.    Important numbers  [facility] ([address]) - [place]: [phone]   [facility] ([address]) : [phone]    Rev. 4/2014Discharge Instructions:  Abdominal Hysterectomy   Healing takes time. How much time depends on your health and the type of surgery you had. During your recovery time, you can do a lot to make sure that you regain your health and energy.    Diet    Eat a well-balanced diet with lots of protein, fruits, vegetables, and whole grains.  Avoid spicy or greasy foods.     Drink plenty of fluids - at least 8 tall glasses of water a day. Water is best. Limit caffeine (coffee, tea, soda) to help prevent constipation (hard stools that are difficult to pass).    If you are constipated, you may take one of these medications from the drug store: Docusate (Colace), Docusate with Casanthranol (Laurel-Colace), Psyllium (Metamucil), or Milk of Magnesia. Follow to directions on the label.  Activity    Get plenty of rest at first. Slowly return to your normal routine. Several short walks each day will help. It is okay to climb stairs, but use the handrail in case you feel dizzy.     After 2 weeks, you may start gentle exercises. Listen to your body. If you feel tired, sore, or have backaches, you may be doing too much too soon.     Do not drive until you can step on the brakes without pain.     Do not use tampons, douche, or have sex (intercourse) until you see your doctor.     For the next 6 weeks, do not lift anything greater than 15 pounds and avoid heavy exercise.  Pain    Your pain should decrease over the next 2-3 weeks.     You may feel sore after mild exercise.    Take your pain medication as prescribed by your doctor.   Caring for your Incisions (if applicable)    You may see some fluid draining from your incision(s). Wear the bandage(s) until it  stops.     Keep the area clean and dry. Wash with soap and water.    Do not use lotions or powders near the incision(s).    If you have:  o Steri-strips (small pieces of tape) - they should fall off on their own in 7-10 days. If that time has passed and they are still in place, you may remove them.  o Staples - These will be removed at your next visit.   o Dermabond (medical glue) - Leave it in place until it wears off.   Bathing  Take care to avoid slips and falls. Gently pat your incisions dry after bathing.    After Abdominal Hysterectomy (surgery through the belly): You may shower. Avoid tub baths and swimming for two weeks, of until your incision heals.     What to expect after surgery    A small amount of blood or fluid coming from your vagina for several weeks. Wear pads as needed.     Stitches poking out of the vagina.     Stitches passing out of the vagina (they will look like tiny threads).    Most stitches dissolve within 3 months.     Feeling numb around your stitches. This should go away in less than a year.     Feeling dizzy or light-headed. Hot flashes, trouble sleeping, sudden mood swings, and irritability. If side effects become a problem, notify your doctor.     If you had a vaginal repair, you may feel tugging in the vagina. This is a normal part of healing.   Call your doctor if you have:    Severe chills and a fever of 100.4 degrees F or higher, taken under the tongue.     Bright red blood coming out of the vagina - enough to soak one pad an hour.     Large clots coming out of the vagina.     Urine or vaginal fluid that smells bad.     Trouble urinating (peeing), burning when you go, or the need to go more often.     Calf pain and/or swelling in both legs.    Nausea (feeling sick to your stomach) or vomiting (throwing up).    Pain that you cannot control with the pain medication prescribed by your doctor.   Follow up with your doctor and return to the clinic in   Make this appointment after you  get home if it has not already been scheduled.     Important numbers  [facility] ([address]) - [place]: [phone]   [facility] ([address]) : [phone]              REV.       Same-Day Surgery   Adult Discharge Orders & Instructions     For 24 hours after surgery:  1. Get plenty of rest.  A responsible adult must stay with you for at least 24 hours after you leave the hospital.   2. Pain medication can slow your reflexes. Do not drive or use heavy equipment.  If you have weakness or tingling, don't drive or use heavy equipment until this feeling goes away.  3. Mixing alcohol and pain medication can cause dizziness and slow your breathing. It can even be fatal. Do not drink alcohol while taking pain medication.  4. Avoid strenuous or risky activities.  Ask for help when climbing stairs.   5. You may feel lightheaded.  If so, sit for a few minutes before standing.  Have someone help you get up.   6. If you have nausea (feel sick to your stomach), drink only clear liquids such as apple juice, ginger ale, broth or 7-Up.  Rest may also help.  Be sure to drink enough fluids.  Move to a regular diet as you feel able. Take pain medications with a small amount of solid food, such as toast or crackers, to avoid nausea.   7. A slight fever is normal. Call the doctor if your fever is over 100 F (37.7 C) (taken under the tongue) or lasts longer than 24 hours.  8. You may have a dry mouth, muscle aches, trouble sleeping or a sore throat.  These symptoms should go away after 24 hours.  9. Do not make important or legal decisions.   Pain Management:      1. Take pain medication (if prescribed) for pain as directed by your physician.        2. WARNING: If the pain medication you have been prescribed contains Tylenol  (acetaminophen), DO NOT take additional doses of Tylenol (acetaminophen).     Call your doctor for any of the followin.  Signs of infection (fever,  growing tenderness at the surgery site, severe pain, a large amount of drainage or bleeding, foul-smelling drainage, redness, swelling).    2.  It has been over 8 to 10 hours since surgery and you are still not able to urinate (pee).    3.  Headache for over 24 hours.    4.  Numbness, tingling or weakness the day after surgery (if you had spinal anesthesia).  To contact a doctor, call _____________________________________ or:      [phone] and ask for the Resident On Call for:          __________________________________________ (answered 24 hours a day)      Emergency Department:  [place] [facility]: [phone]  [place] [facility]: [phone]               Rev. 10/2014

## 2020-08-24 NOTE — OR NURSING
Pt doing well. Pain is manageable at this time. Pt has had intermittent nausea but tolerating fluids. Pt declined zofran d/t nausea improving. Pt transferrred to phase 2 and report given to Mile Luna RN

## 2020-08-24 NOTE — ANESTHESIA PREPROCEDURE EVALUATION
"Anesthesia Pre-Procedure Evaluation    Patient: Shaila Redd   MRN:     [id number] Gender:   adult   Age:    25 year old :      1995        Preoperative Diagnosis: Gender dysphoria [F64.9]   Procedure(s):  HYSTERECTOMY, TOTAL, LAPAROSCOPIC, WITH BILATERAL SALPINGO-OOPHORECTOMY  CYSTOSCOPY     LABS:  CBC: No results found for: WBC, HGB, HCT, PLT  BMP: No results found for: NA, POTASSIUM, CHLORIDE, CO2, BUN, CR, GLC  COAGS: No results found for: PTT, INR, FIBR  POC: No results found for: BGM, HCG, HCGS  OTHER: No results found for: PH, LACT, A1C, RACHELE, PHOS, MAG, ALBUMIN, PROTTOTAL, ALT, AST, GGT, ALKPHOS, BILITOTAL, BILIDIRECT, LIPASE, AMYLASE, TAMARA, TSH, T4, T3, CRP, SED     Preop Vitals    BP Readings from Last 3 Encounters:   20 103/75   20 120/86   19 117/66    Pulse Readings from Last 3 Encounters:   20 73   20 94   19 89      Resp Readings from Last 3 Encounters:   20 16   19 16    SpO2 Readings from Last 3 Encounters:   20 97%   19 98%      Temp Readings from Last 1 Encounters:   20 36.9  C (98.4  F) (Oral)    Ht Readings from Last 1 Encounters:   20 1.6 m (5' 2.99\")      Wt Readings from Last 1 Encounters:   20 94 kg (207 lb 3.7 oz)    Estimated body mass index is 36.72 kg/m  as calculated from the following:    Height as of this encounter: 1.6 m (5' 2.99\").    Weight as of this encounter: 94 kg (207 lb 3.7 oz).     LDA:  Closed/Suction Drain 1 Right Breast 15 Kosovan (Active)   Number of days: 550       Closed/Suction Drain Left Breast 15 Kosovan (Active)   Number of days: 550        Past Medical History:   Diagnosis Date     Uncomplicated asthma       Past Surgical History:   Procedure Laterality Date     BREAST SURGERY  2019     TRANSGENDER MASTECTOMY Bilateral 2019    Procedure: Bilateral Mastectomy with Free Nipple Grafting;  Surgeon: David Akins MD;  Location: [facility] OR      Allergies   Allergen Reactions     " Bee Swelling        Anesthesia Evaluation     . Pt has had prior anesthetic.     No history of anesthetic complications          ROS/MED HX    ENT/Pulmonary:     (+)Intermittent asthma Treatment: Inhaler prn,  , . .    Neurologic:  - neg neurologic ROS     Cardiovascular:  - neg cardiovascular ROS       METS/Exercise Tolerance:  4 - Raking leaves, gardening   Hematologic:  - neg hematologic  ROS       Musculoskeletal:  - neg musculoskeletal ROS       GI/Hepatic:  - neg GI/hepatic ROS       Renal/Genitourinary:  - ROS Renal section negative       Endo:     (+) Obesity, .      Psychiatric:         Infectious Disease:  - neg infectious disease ROS       Malignancy:      - no malignancy   Other:    (+) No chance of pregnancy                        PHYSICAL EXAM:   Mental Status/Neuro: A/A/O; Age Appropriate   Airway: Facies: Feasible  Mallampati: II  Mouth/Opening: Full  TM distance: > 6 cm  Neck ROM: Full   Respiratory: Auscultation: CTAB     Resp. Rate: Normal     Resp. Effort: Normal      CV: Rhythm: Regular  Rate: Age appropriate  Heart: Normal Sounds  Edema: None   Comments:      Dental: Normal Dentition                Assessment:   ASA SCORE: 3    H&P: History and physical reviewed and following examination; no interval change.   Smoking Status:  Non-Smoker/Unknown   NPO Status: NPO Appropriate     Plan:   Anes. Type:  General   Pre-Medication: None   Induction:  IV (Standard)   Airway: ETT; Oral   Access/Monitoring: PIV   Maintenance: Balanced     Postop Plan:   Postop Pain: Opioids  Postop Sedation/Airway: Not planned  Disposition: Inpatient/Admit     PONV Management:   Adult Risk Factors:, Non-Smoker, Postop Opioids   Prevention: Ondansetron     CONSENT: Direct conversation   Plan and risks discussed with: Patient   Blood Products: Consented (ALL Blood Products)                   Sarah Wachter, MD

## 2020-08-24 NOTE — BRIEF OP NOTE
[facility], [place]    Brief Operative Note    Pre-operative diagnosis: Gender dysphoria [F64.9]  Post-operative diagnosis Same as pre-operative diagnosis    Procedure: Procedure(s):  HYSTERECTOMY, TOTAL, LAPAROSCOPIC, WITH BILATERAL SALPINGO-OOPHORECTOMY  CYSTOSCOPY  Surgeon: Surgeon(s) and Role:     * Mary Martinez MD - Primary     * Taya Davis MD - Resident - Assisting     * Mo Bonner MD - Resident - Assisting  Anesthesia: General   Estimated blood loss: 25mL  Drains: None  UOP: 400mL  IVF: 700mL  Specimens:   ID Type Source Tests Collected by Time Destination   A : Uterus, Cervix and Bilateral Fallopian Tubes and ovaries Tissue Uterus, Cervix and Bilateral Fallopian Tubes SURGICAL PATHOLOGY EXAM Mary Martinez MD 8/24/2020  8:55 AM      Findings:   small cervix flush with vaginal wall. normal abdominal survey. Normal uterus, flallopian tubes, and ovaries. L ovary was adherent to the side wall. bilateral ureters identified. cystoscopy revealed bilateral active UO, no bladder defect. .  Complications: None.  Implants: * No implants in log *      Mo Bonner MD  OB/GYN PGY-1  08/24/20 11:43 AM

## 2020-08-24 NOTE — ANESTHESIA PROCEDURE NOTES
Peripheral Nerve Block Procedure Note  Staff -   Anesthesiologist:  Davon Loza MD      Performed By: anesthesiologist        Location: Pre-op  Procedure Start/Stop TImes:      8/24/2020 8:50 AM     8/24/2020 9:00 AM    patient identified, IV checked, site marked, risks and benefits discussed, informed consent and monitors and equipment checked      Correct Patient: Yes      Correct Position: Yes      Correct Site: Yes      Correct Procedure: Yes      Correct Laterality:  Yes    Site Marked:  Yes  Procedure details:     Procedure:  TAP    ASA:  2    Laterality:  Bilateral    Position:  Supine    Sterile Prep: chloraprep      Local skin infiltration:  None    Needle:  Short bevel    Needle gauge:  21    Needle length (inches):  3.13    Ultrasound: Yes      Ultrasound used to identify targeted nerve, plexus, or vascular structure and placed a needle adjacent to it      Permanent Image entered into patiient's record      Abnormal pain on injection: No      Blood Aspirated: No      Paresthesias:  No    Bleeding at site: No      Bolus via:  Needle    Infusion Method:  Single Shot    Blood aspirated via catheter: No      Complications:  None

## 2020-08-24 NOTE — ANESTHESIA POSTPROCEDURE EVALUATION
Anesthesia POST Procedure Evaluation    Patient: Shaila Redd   MRN:     [id number] Gender:   adult   Age:    25 year old :      1995        Preoperative Diagnosis: Gender dysphoria [F64.9]   Procedure(s):  HYSTERECTOMY, TOTAL, LAPAROSCOPIC, WITH BILATERAL SALPINGO-OOPHORECTOMY  CYSTOSCOPY   Postop Comments: No value filed.     Anesthesia Type: General       Disposition: Outpatient   Postop Pain Control: Uneventful            Sign Out: Well controlled pain   PONV: No   Neuro/Psych: Uneventful            Sign Out: Acceptable/Baseline neuro status   Airway/Respiratory: Uneventful            Sign Out: Acceptable/Baseline resp. status   CV/Hemodynamics: Uneventful            Sign Out: Acceptable CV status   Other NRE: NONE   DID A NON-ROUTINE EVENT OCCUR? No         Last Anesthesia Record Vitals:  CRNA VITALS  2020 1136 - 2020 1236      2020             Resp Rate (observed):  (!) 6          Last PACU Vitals:  Vitals Value Taken Time   /68 2020  1:30 PM   Temp 36.8  C (98.2  F) 2020 12:45 PM   Pulse 86 2020  1:32 PM   Resp 12 2020  1:32 PM   SpO2 95 % 2020  1:32 PM   Temp src     NIBP     Pulse     SpO2     Resp     Temp     Ht Rate     Temp 2     Vitals shown include unvalidated device data.      Electronically Signed By: Sarah Wachter, MD, 2020, 1:54 PM

## 2020-08-24 NOTE — ANESTHESIA CARE TRANSFER NOTE
Patient: Shaila Redd    Procedure(s):  HYSTERECTOMY, TOTAL, LAPAROSCOPIC, WITH BILATERAL SALPINGO-OOPHORECTOMY  CYSTOSCOPY    Diagnosis: Gender dysphoria [F64.9]  Diagnosis Additional Information: No value filed.    Anesthesia Type:   General     Note:  Airway :Face Mask  Patient transferred to:PACU  Comments: 98%, , 127/83, RR 20, T 37.0Handoff Report: Identifed the Patient, Identified the Reponsible Provider, Reviewed the pertinent medical history, Discussed the surgical course, Reviewed Intra-OP anesthesia mangement and issues during anesthesia, Set expectations for post-procedure period and Allowed opportunity for questions and acknowledgement of understanding      Vitals: (Last set prior to Anesthesia Care Transfer)    CRNA VITALS  8/24/2020 1136 - 8/24/2020 1216      8/24/2020             Resp Rate (observed):  (!) 6                Electronically Signed By: LIO Holguin CRNA  August 24, 2020  12:16 PM

## 2020-08-24 NOTE — OR NURSING
Bilateral TAP block performed without complications.  VSS.  Pt tolerated well.  Will continue to monitor.

## 2020-09-01 DIAGNOSIS — F64.9 GENDER DYSPHORIA: Primary | ICD-10-CM

## 2020-09-01 RX ORDER — OXYCODONE HYDROCHLORIDE 5 MG/1
5 TABLET ORAL EVERY 6 HOURS PRN
Qty: 8 TABLET | Refills: 0 | Status: SHIPPED | OUTPATIENT
Start: 2020-09-01 | End: 2020-09-17

## 2020-09-01 NOTE — TELEPHONE ENCOUNTER
Discussed with Dr. Dobbins. Plan for prescription of oxycodone to be sent to pharmacy. Advised to monitor for signs of infection and call if pain continues or becomes severe. Patient states understanding.

## 2020-09-01 NOTE — TELEPHONE ENCOUNTER
Patient had hysterectomy 8/24. Noted onset of pain on right side, underneath incision that started the day following procedure.     Patient used oxycodone q. 6 hours as prescribed. Prescription exhausted. Also used alternating doses of ibuprofen and Tylenol every 6-8 hours. Tylenol last use was yesterday as he has run out of Tylenol today. Continues ibuprofen.     Shaila states that use of these medications has not touched this right-sided pain.    Shaila states pain is near constant in nature. Not worse with movement. Has difficulty getting comfortable when resting in a chair. Difficulty with sleep. Denies fever chills. Urinating fine. Patient states he is well hydrated.     Discussed with Shaila, will discuss with provider in clinic to determine plan of care regarding ongoing pain.

## 2020-09-04 LAB — COPATH REPORT: NORMAL

## 2020-09-05 ENCOUNTER — TELEPHONE (OUTPATIENT)
Dept: OBGYN | Facility: CLINIC | Age: 25
End: 2020-09-05

## 2020-09-05 NOTE — TELEPHONE ENCOUNTER
"Returned answering service call for \"still having right sided pain, meds not working ? what to do\".  Shaila states for the first couple days postop, he was doing well.  Then, approximately one week later (~9/1/20) developed right sided, a few cm anterior to the RLQ incision pain that he was prescribed additional oxycodone for which he states \"still hasn't worked\".  He has also noticed \"darker pee\" and the sensation that it's a bit harder to get his stream going.  Also notes occ nausea, denies f/c/d/change in bowel habits.  Given ongoing pain, unresponsive to oxycodone and atypical time course of his pain, recommended he be seen urgently for imaging and further work up.  Shaila expressed understanding.    Leia Spivey MD MPH     "

## 2020-09-16 ENCOUNTER — TELEPHONE (OUTPATIENT)
Dept: OBGYN | Facility: CLINIC | Age: 25
End: 2020-09-16

## 2020-09-16 DIAGNOSIS — T81.41XA SUPERFICIAL INCISIONAL INFECTION OF SURGICAL SITE: Primary | ICD-10-CM

## 2020-09-16 NOTE — TELEPHONE ENCOUNTER
Call back from Shaila who states hyst incision site has new redness, puffyness, and scab has fallen off. Drainage present but no odor and clear in coloration.. Moderately painful with palpation, mild pain at rest. Denies severe pain, denies fever.     Recommended pt come to clinic for incision check and pt agrees with plan. Scheduled with Dr. Dobbins at 1:00.     --Pt notes some burden regarding this appointment time as pt is meant to work at 2:00 tomorrow but will come if no other option. Will route message to on-call MD to determine if abx therapy appropriate without appointment--

## 2020-09-16 NOTE — TELEPHONE ENCOUNTER
Shaila left message that he is experiencing possible infection at incision site.    Call back to patient but reached voicemail. Left message to call triage to discuss, but if has developed fever or severe pain should present to ED for evaluation.

## 2020-09-17 ENCOUNTER — OFFICE VISIT (OUTPATIENT)
Dept: OBGYN | Facility: CLINIC | Age: 25
End: 2020-09-17
Attending: OBSTETRICS & GYNECOLOGY
Payer: COMMERCIAL

## 2020-09-17 VITALS — HEART RATE: 88 BPM | SYSTOLIC BLOOD PRESSURE: 118 MMHG | DIASTOLIC BLOOD PRESSURE: 86 MMHG | TEMPERATURE: 98.3 F

## 2020-09-17 DIAGNOSIS — T81.41XA SUPERFICIAL INCISIONAL INFECTION OF SURGICAL SITE: Primary | ICD-10-CM

## 2020-09-17 PROCEDURE — G0463 HOSPITAL OUTPT CLINIC VISIT: HCPCS | Mod: ZF

## 2020-09-17 RX ORDER — SULFAMETHOXAZOLE/TRIMETHOPRIM 800-160 MG
1 TABLET ORAL 2 TIMES DAILY
Qty: 10 TABLET | Refills: 0 | Status: SHIPPED | OUTPATIENT
Start: 2020-09-17 | End: 2020-09-22

## 2020-09-17 NOTE — PROGRESS NOTES
24 yo trans male now 3.5 weeks s/p total laparoscopic hysterectomy/BSO.  The umbilical and LLQ incisions have healed well, but he has increased pain and drainage from the RLQ.  He continues to need tylenol and ibuprofen for pain. He denies fever and chills.  He started Bactrim this morning.  He is otherwise doing well     ROS: 10 point ROS neg other than the symptoms noted above in the HPI.    Past Medical History:   Diagnosis Date     Uncomplicated asthma      Past Surgical History:   Procedure Laterality Date     BREAST SURGERY  02/21/2019     CYSTOSCOPY N/A 8/24/2020    Procedure: CYSTOSCOPY;  Surgeon: Mary Martinez MD;  Location: [facility] OR     LAPAROSCOPIC HYSTERECTOMY TOTAL, BILATERAL SALPINGO-OOPHORECTOMY, COMBINED Bilateral 8/24/2020    Procedure: HYSTERECTOMY, TOTAL, LAPAROSCOPIC, WITH BILATERAL SALPINGO-OOPHORECTOMY;  Surgeon: Mary Martinez MD;  Location: [facility] OR     TRANSGENDER MASTECTOMY Bilateral 2/21/2019    Procedure: Bilateral Mastectomy with Free Nipple Grafting;  Surgeon: David Akins MD;  Location: [facility] OR     Physical exam:  /86   Pulse 88   Temp 98.3  F (36.8  C) (Oral)   Gen'l: appears well  Abd: completely healed umbilical and LLQ incision. RLQ is  with small amount of pale yellow/clear discharge, no fluctuance, scab noted within  skin edges.  The skin surrounding the wound has approximately 2mm of redness surrounding incision. This is not warm or raised and is more consistent with secondary healing than cellulitis     Assessment/Plan  Wound separation at port site with mild infection    - complete bactrim course  - continue tylenol and ibuprofen for pain  - return to clinic in 1 week, if improved consider approximating skin edges with steristrip.    Ekaterina Dobbins MD

## 2020-09-17 NOTE — TELEPHONE ENCOUNTER
"Per Dr. Ybarra: \"start antibiotics and can be told to stop tomorrow if no concerns for cellulitis at appointment.  Treatment should be Bactrim (Trimethoprim-sulfamethoxazole) 1 double-strength tablet orally twice daily for 5 days.  Thanks! \"    Sent message to patient and bactrim to pharmacy  "

## 2020-09-17 NOTE — LETTER
9/17/2020       RE: Shaila Redd  [address] [facility] [address]  [place] [address]     Dear Colleague,    Thank you for referring your patient, Shaila Redd, to the [facility] at [facility]. Please see a copy of my visit note below.    26 yo trans male now 3.5 weeks s/p total laparoscopic hysterectomy/BSO.  The umbilical and LLQ incisions have healed well, but he has increased pain and drainage from the RLQ.  He continues to need tylenol and ibuprofen for pain. He denies fever and chills.  He started Bactrim this morning.  He is otherwise doing well     ROS: 10 point ROS neg other than the symptoms noted above in the HPI.    Past Medical History:   Diagnosis Date     Uncomplicated asthma      Past Surgical History:   Procedure Laterality Date     BREAST SURGERY  02/21/2019     CYSTOSCOPY N/A 8/24/2020    Procedure: CYSTOSCOPY;  Surgeon: Mary Martinez MD;  Location: [facility] OR     LAPAROSCOPIC HYSTERECTOMY TOTAL, BILATERAL SALPINGO-OOPHORECTOMY, COMBINED Bilateral 8/24/2020    Procedure: HYSTERECTOMY, TOTAL, LAPAROSCOPIC, WITH BILATERAL SALPINGO-OOPHORECTOMY;  Surgeon: Mary Martinez MD;  Location: [facility] OR     TRANSGENDER MASTECTOMY Bilateral 2/21/2019    Procedure: Bilateral Mastectomy with Free Nipple Grafting;  Surgeon: David Akins MD;  Location: [facility] OR     Physical exam:  /86   Pulse 88   Temp 98.3  F (36.8  C) (Oral)   Gen'l: appears well  Abd: completely healed umbilical and LLQ incision. RLQ is  with small amount of pale yellow/clear discharge, no fluctuance, scab noted within  skin edges.  The skin surrounding the wound has approximately 2mm of redness surrounding incision. This is not warm or raised and is more consistent with secondary healing than cellulitis     Assessment/Plan  Wound separation at port site with mild infection    - complete bactrim course  - continue tylenol and ibuprofen for pain  - return to clinic  in 1 week, if improved consider approximating skin edges with steristrip.    Ekaterina Dobbins MD

## 2020-09-25 ENCOUNTER — APPOINTMENT (OUTPATIENT)
Dept: GENERAL RADIOLOGY | Facility: CLINIC | Age: 25
End: 2020-09-25
Attending: PHYSICIAN ASSISTANT
Payer: COMMERCIAL

## 2020-09-25 ENCOUNTER — APPOINTMENT (OUTPATIENT)
Dept: CT IMAGING | Facility: CLINIC | Age: 25
End: 2020-09-25
Attending: PHYSICIAN ASSISTANT
Payer: COMMERCIAL

## 2020-09-25 ENCOUNTER — HOSPITAL ENCOUNTER (EMERGENCY)
Facility: CLINIC | Age: 25
Discharge: HOME | End: 2020-09-25
Attending: PHYSICIAN ASSISTANT | Admitting: PHYSICIAN ASSISTANT
Payer: COMMERCIAL

## 2020-09-25 VITALS
SYSTOLIC BLOOD PRESSURE: 115 MMHG | HEART RATE: 95 BPM | DIASTOLIC BLOOD PRESSURE: 73 MMHG | TEMPERATURE: 98.9 F | OXYGEN SATURATION: 97 %

## 2020-09-25 DIAGNOSIS — S06.0XAA CONCUSSION: ICD-10-CM

## 2020-09-25 DIAGNOSIS — S16.1XXA STRAIN OF NECK MUSCLE, INITIAL ENCOUNTER: ICD-10-CM

## 2020-09-25 DIAGNOSIS — T07.XXXA MULTIPLE ABRASIONS: ICD-10-CM

## 2020-09-25 DIAGNOSIS — R10.9 ACUTE ABDOMINAL PAIN: ICD-10-CM

## 2020-09-25 DIAGNOSIS — M54.9 BACK PAIN: ICD-10-CM
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
## 2020-09-25 LAB
ABO + RH BLD: NORMAL
ABO + RH BLD: NORMAL
ALBUMIN SERPL-MCNC: 3.7 G/DL (ref 3.4–5)
ALP SERPL-CCNC: 74 U/L (ref 40–150)
ALT SERPL W P-5'-P-CCNC: 32 U/L (ref 0–70)
ANION GAP SERPL CALCULATED.3IONS-SCNC: 6 MMOL/L (ref 3–14)
APTT PPP: 31 SEC (ref 22–37)
AST SERPL W P-5'-P-CCNC: 18 U/L (ref 0–45)
BASOPHILS # BLD AUTO: 0.1 10E9/L (ref 0–0.2)
BASOPHILS NFR BLD AUTO: 0.5 %
BILIRUB SERPL-MCNC: 0.5 MG/DL (ref 0.2–1.3)
BLD GP AB SCN SERPL QL: NORMAL
BLOOD BANK CMNT PATIENT-IMP: NORMAL
BUN SERPL-MCNC: 14 MG/DL (ref 7–30)
CALCIUM SERPL-MCNC: 8.8 MG/DL (ref 8.5–10.1)
CHLORIDE SERPL-SCNC: 107 MMOL/L (ref 94–109)
CO2 SERPL-SCNC: 25 MMOL/L (ref 20–32)
CREAT SERPL-MCNC: 0.79 MG/DL (ref 0.66–1.25)
DIFFERENTIAL METHOD BLD: NORMAL
EOSINOPHIL # BLD AUTO: 0.2 10E9/L (ref 0–0.7)
EOSINOPHIL NFR BLD AUTO: 1.7 %
ERYTHROCYTE [DISTWIDTH] IN BLOOD BY AUTOMATED COUNT: 11.9 % (ref 10–15)
GFR SERPL CREATININE-BSD FRML MDRD: >90 ML/MIN/{1.73_M2}
GLUCOSE SERPL-MCNC: 100 MG/DL (ref 70–99)
HCT VFR BLD AUTO: 44.4 % (ref 40–53)
HGB BLD-MCNC: 15.1 G/DL (ref 13.3–17.7)
IMM GRANULOCYTES # BLD: 0 10E9/L (ref 0–0.4)
IMM GRANULOCYTES NFR BLD: 0.4 %
INR PPP: 0.93 (ref 0.86–1.14)
LYMPHOCYTES # BLD AUTO: 2.5 10E9/L (ref 0.8–5.3)
LYMPHOCYTES NFR BLD AUTO: 24.8 %
MCH RBC QN AUTO: 30.2 PG (ref 26.5–33)
MCHC RBC AUTO-ENTMCNC: 34 G/DL (ref 31.5–36.5)
MCV RBC AUTO: 89 FL (ref 78–100)
MONOCYTES # BLD AUTO: 0.7 10E9/L (ref 0–1.3)
MONOCYTES NFR BLD AUTO: 7.2 %
NEUTROPHILS # BLD AUTO: 6.6 10E9/L (ref 1.6–8.3)
NEUTROPHILS NFR BLD AUTO: 65.4 %
NRBC # BLD AUTO: 0 10*3/UL
NRBC BLD AUTO-RTO: 0 /100
PLATELET # BLD AUTO: 315 10E9/L (ref 150–450)
POTASSIUM SERPL-SCNC: 3.9 MMOL/L (ref 3.4–5.3)
PROT SERPL-MCNC: 7.8 G/DL (ref 6.8–8.8)
RBC # BLD AUTO: 5 10E12/L (ref 4.4–5.9)
SODIUM SERPL-SCNC: 138 MMOL/L (ref 133–144)
SPECIMEN EXP DATE BLD: NORMAL
WBC # BLD AUTO: 10.1 10E9/L (ref 4–11)

## 2020-09-25 PROCEDURE — 25000125 ZZHC RX 250: Performed by: PHYSICIAN ASSISTANT

## 2020-09-25 PROCEDURE — 86900 BLOOD TYPING SEROLOGIC ABO: CPT | Performed by: PHYSICIAN ASSISTANT

## 2020-09-25 PROCEDURE — 93005 ELECTROCARDIOGRAM TRACING: CPT

## 2020-09-25 PROCEDURE — 72125 CT NECK SPINE W/O DYE: CPT

## 2020-09-25 PROCEDURE — 86850 RBC ANTIBODY SCREEN: CPT | Performed by: PHYSICIAN ASSISTANT

## 2020-09-25 PROCEDURE — 25000128 H RX IP 250 OP 636: Performed by: PHYSICIAN ASSISTANT

## 2020-09-25 PROCEDURE — 70450 CT HEAD/BRAIN W/O DYE: CPT

## 2020-09-25 PROCEDURE — 85610 PROTHROMBIN TIME: CPT | Performed by: PHYSICIAN ASSISTANT

## 2020-09-25 PROCEDURE — 74177 CT ABD & PELVIS W/CONTRAST: CPT

## 2020-09-25 PROCEDURE — 86901 BLOOD TYPING SEROLOGIC RH(D): CPT | Performed by: PHYSICIAN ASSISTANT

## 2020-09-25 PROCEDURE — 71046 X-RAY EXAM CHEST 2 VIEWS: CPT

## 2020-09-25 PROCEDURE — 85730 THROMBOPLASTIN TIME PARTIAL: CPT | Performed by: PHYSICIAN ASSISTANT

## 2020-09-25 PROCEDURE — 80053 COMPREHEN METABOLIC PANEL: CPT | Performed by: PHYSICIAN ASSISTANT

## 2020-09-25 PROCEDURE — 99285 EMERGENCY DEPT VISIT HI MDM: CPT | Mod: 25

## 2020-09-25 PROCEDURE — 85025 COMPLETE CBC W/AUTO DIFF WBC: CPT | Performed by: PHYSICIAN ASSISTANT

## 2020-09-25 RX ORDER — IOPAMIDOL 755 MG/ML
500 INJECTION, SOLUTION INTRAVASCULAR ONCE
Status: COMPLETED | OUTPATIENT
Start: 2020-09-25 | End: 2020-09-25

## 2020-09-25 RX ADMIN — IOPAMIDOL 100 ML: 755 INJECTION, SOLUTION INTRAVENOUS at 18:44

## 2020-09-25 RX ADMIN — SODIUM CHLORIDE 65 ML: 9 INJECTION, SOLUTION INTRAVENOUS at 18:46

## 2020-09-25 ASSESSMENT — ENCOUNTER SYMPTOMS
NUMBNESS: 0
SPEECH DIFFICULTY: 0
BACK PAIN: 1
SHORTNESS OF BREATH: 1
NECK PAIN: 1
ARTHRALGIAS: 1
MYALGIAS: 1
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
## 2020-09-25 NOTE — ED AVS SNAPSHOT
[facility] Emergency Department  [address]  [facility] [address]  Phone:  [phone]  Fax:  [phone]                                    Shaila Redd   MRN: [id number]    Department:  [facility] Emergency Department   Date of Visit:  9/25/2020           After Visit Summary Signature Page    I have received my discharge instructions, and my questions have been answered. I have discussed any challenges I see with this plan with the nurse or doctor.    ..........................................................................................................................................  Patient/Patient Representative Signature      ..........................................................................................................................................  Patient Representative Print Name and Relationship to Patient    ..................................................               ................................................  Date                                   Time    ..........................................................................................................................................  Reviewed by Signature/Title    ...................................................              ..............................................  Date                                               Time          22EPIC Rev 08/18
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
## 2020-09-26 LAB — INTERPRETATION ECG - MUSE: NORMAL

## 2020-10-06 ENCOUNTER — OFFICE VISIT (OUTPATIENT)
Dept: OBGYN | Facility: CLINIC | Age: 25
End: 2020-10-06
Attending: OBSTETRICS & GYNECOLOGY
Payer: COMMERCIAL

## 2020-10-06 VITALS
HEIGHT: 63 IN | HEART RATE: 86 BPM | DIASTOLIC BLOOD PRESSURE: 79 MMHG | BODY MASS INDEX: 36.72 KG/M2 | SYSTOLIC BLOOD PRESSURE: 110 MMHG

## 2020-10-06 DIAGNOSIS — Z90.79 S/P TOTAL HYSTERECTOMY AND BSO (BILATERAL SALPINGO-OOPHORECTOMY): Primary | ICD-10-CM

## 2020-10-06 DIAGNOSIS — Z90.722 S/P TOTAL HYSTERECTOMY AND BSO (BILATERAL SALPINGO-OOPHORECTOMY): Primary | ICD-10-CM

## 2020-10-06 DIAGNOSIS — Z90.710 S/P TOTAL HYSTERECTOMY AND BSO (BILATERAL SALPINGO-OOPHORECTOMY): Primary | ICD-10-CM

## 2020-10-06 PROCEDURE — 99024 POSTOP FOLLOW-UP VISIT: CPT | Performed by: OBSTETRICS & GYNECOLOGY
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
## 2020-10-07 ENCOUNTER — TELEPHONE (OUTPATIENT)
Dept: OBGYN | Facility: CLINIC | Age: 25
End: 2020-10-07

## 2020-10-07 NOTE — TELEPHONE ENCOUNTER
Spoke with Rosibel, need sterilization Consent form completed.    The hysterectomy acknowledgement statement will not meet BC/BS requirements

## 2020-10-07 NOTE — TELEPHONE ENCOUNTER
----- Message from Kathy Bain sent at 10/7/2020 10:49 AM CDT -----  Regarding: forms  Contact: [phone]  Rosibel, from central billing office, is calling wondering if we received the consent forms for [employer], please call rosibel back at [phone] thanks

## 2020-10-07 NOTE — TELEPHONE ENCOUNTER
Spoke with Rosibel   at [phone], she is sending a new consent form for surgery where Shaila had hysterectomy, bilateral  salpingo oophorectomy and cystocopy .  This is for BC/BS and needs to be completed to be submitted for insurance coverage.  Number provided for fax.

## 2020-10-07 NOTE — TELEPHONE ENCOUNTER
Forms are for Consent for Sterilization.  Will route to Dr. Martinez to review.  I do not see forms in media.  Please advise.

## 2020-10-07 NOTE — TELEPHONE ENCOUNTER
Rosibel in business office has not seen hysterectomy sterilization consent.  She is asking  If the Consent for sterilization may also be completed.  Forms placed on Dr. Martinez's desk

## 2021-01-03 ENCOUNTER — HEALTH MAINTENANCE LETTER (OUTPATIENT)
Age: 26
End: 2021-01-03

## 2021-04-25 ENCOUNTER — HEALTH MAINTENANCE LETTER (OUTPATIENT)
Age: 26
End: 2021-04-25

## 2021-10-10 ENCOUNTER — HEALTH MAINTENANCE LETTER (OUTPATIENT)
Age: 26
End: 2021-10-10

## 2022-05-21 ENCOUNTER — HEALTH MAINTENANCE LETTER (OUTPATIENT)
Age: 27
End: 2022-05-21

## 2022-09-18 ENCOUNTER — HEALTH MAINTENANCE LETTER (OUTPATIENT)
Age: 27
End: 2022-09-18

## 2023-06-04 ENCOUNTER — HEALTH MAINTENANCE LETTER (OUTPATIENT)
Age: 28
End: 2023-06-04

## 2024-11-02 ASSESSMENT — ANXIETY QUESTIONNAIRES: GAD7 TOTAL SCORE: 6

## (undated) DEVICE — BLADE KNIFE SURG 10 371110

## (undated) DEVICE — ENDO TROCAR FIRST ENTRY KII FIOS ADV FIX 05X100MM CFF03

## (undated) DEVICE — TUBING IRRIG CYSTO/BLADDER SET 81" LF 2C4040

## (undated) DEVICE — DEVICE SUTURE PASSER 14GA WECK EFX EFXSP2

## (undated) DEVICE — DRSG XEROFORM 5X9" 8884431605

## (undated) DEVICE — TUBING SMOKE EVAC PNEUVIEW 9660-XE

## (undated) DEVICE — BLADE KNIFE SURG 15 371115

## (undated) DEVICE — ENDO TROCAR FIRST ENTRY KII FIOS ADV FIX 05X150MM CFF01

## (undated) DEVICE — ESU HOLDER LAP INST DISP PURPLE LONG 330MM H-PRO-330

## (undated) DEVICE — LIGHT HANDLE X2

## (undated) DEVICE — DRAPE TIBURON TOP SHEET 100X60" 29352

## (undated) DEVICE — SUCTION MANIFOLD DORNOCH ULTRA CART UL-CL500

## (undated) DEVICE — LINEN TOWEL PACK X5 5464

## (undated) DEVICE — BLADE CLIPPER SGL USE 9680

## (undated) DEVICE — NDL INSUFFLATION 13GA 120MM C2201

## (undated) DEVICE — SOL WATER IRRIG 3000ML BAG 2B7117

## (undated) DEVICE — PEN MARKING SKIN TYCO DEVON DUAL TIP 31145868

## (undated) DEVICE — SUCTION MANIFOLD NEPTUNE 2 SYS 1 PORT 702-025-000

## (undated) DEVICE — ESU GROUND PAD ADULT W/CORD E7507

## (undated) DEVICE — DRAPE WARMER 66X44" ORS-300

## (undated) DEVICE — DRAPE SLEEVE 599

## (undated) DEVICE — ADH LIQUID MASTISOL TOPICAL VIAL 2-3ML 0523-48

## (undated) DEVICE — SYR 20ML LL W/O NDL 302830

## (undated) DEVICE — SU ETHILON 3-0 PS-1 18" 1663H

## (undated) DEVICE — ENDO TROCAR SLEEVE KII ADV FIXATION 05X100MM CFS02

## (undated) DEVICE — ESU PENCIL SMOKE EVAC W/ROCKER SWITCH 0703-047-000

## (undated) DEVICE — PAD CHUX UNDERPAD 30X36" P3036C

## (undated) DEVICE — SPONGE LAP 18X18" X8435

## (undated) DEVICE — WIPES FOLEY CARE SURESTEP PROVON DFC100

## (undated) DEVICE — LINEN GOWN X4 5410

## (undated) DEVICE — GLOVE PROTEXIS BLUE W/NEU-THERA 7.5  2D73EB75

## (undated) DEVICE — SOL NACL 0.9% IRRIG 1000ML BOTTLE 2F7124

## (undated) DEVICE — DRSG BANDAID 3/4X3" FABRIC CURITY LATEX FREE 44100

## (undated) DEVICE — BNDG ELASTIC 6"X5YDS UNSTERILE 6611-60

## (undated) DEVICE — GLOVE PROTEXIS BLUE W/NEU-THERA 6.5  2D73EB65

## (undated) DEVICE — DRSG KERLIX 4 1/2"X4YDS ROLL 6730

## (undated) DEVICE — ESU GROUND PAD UNIVERSAL W/O CORD

## (undated) DEVICE — SOL WATER IRRIG 1000ML BOTTLE 2F7114

## (undated) DEVICE — ESU LIGASURE LAPAROSCOPIC BLUNT TIP SEALER 5MMX37CM LF1837

## (undated) DEVICE — DECANTER BAG 2002S

## (undated) DEVICE — ENDO TROCAR FIRST ENTRY KII FIOS ADV FIX 12X100MM CFF73

## (undated) DEVICE — SU STRATAFIX MONOCRYL 3-0 SPIRAL PS-2 45CM SXMP1B107

## (undated) DEVICE — RETR ELEV / UTERINE MANIPULATOR V-CARE SM CUP 60-6085-200A

## (undated) DEVICE — DRAPE IOBAN INCISE 23X17" 6650EZ

## (undated) DEVICE — ADH SKIN CLOSURE PREMIERPRO EXOFIN 1.0ML 3470

## (undated) DEVICE — GLOVE PROTEXIS W/NEU-THERA 6.5  2D73TE65

## (undated) DEVICE — SU PLAIN FAST ABSORB 5-0 PC-1 18" 1915G

## (undated) DEVICE — SU CHROMIC 3-0 SH 27" G122H

## (undated) DEVICE — GLOVE PROTEXIS MICRO 6.5  2D73PM65

## (undated) DEVICE — Device

## (undated) DEVICE — SYR BULB IRRIG 50ML LATEX FREE 0035280

## (undated) DEVICE — PREP CHLORAPREP 26ML TINTED ORANGE  260815

## (undated) DEVICE — ESU ELEC BLADE 2.75" COATED/INSULATED E1455

## (undated) DEVICE — SOL NACL 0.9% INJ 1000ML BAG 2B1324X

## (undated) DEVICE — BNDG ABDOMINAL BINDER 9X62-84" 79-89210

## (undated) DEVICE — SUCTION TIP YANKAUER STR K87

## (undated) DEVICE — DRSG STERI STRIP 1/2X4" R1547

## (undated) DEVICE — CATH TRAY FOLEY SURESTEP 16FR WDRAIN BAG STLK LATEX A300316A

## (undated) DEVICE — SUCTION IRR STRYKERFLOW II W/TIP 250-070-520

## (undated) DEVICE — DRSG ABDOMINAL 07 1/2X8" 7197D

## (undated) DEVICE — TUBING C02 INSUFFLATION LAP FILTER HEATER 6198

## (undated) DEVICE — PAD PERI INDIV WRAP 11" 2022A

## (undated) DEVICE — SU MONOCRYL 2-0 SH 27" UND Y417H

## (undated) DEVICE — DRSG TELFA 3X8" 1238

## (undated) DEVICE — SYR 01ML TBC SLIP TIP W/O NDL

## (undated) DEVICE — PACK ENT MINOR CUSTOM ASC

## (undated) DEVICE — SOL ADH LIQUID BENZOIN SWAB 0.6ML C1544

## (undated) DEVICE — DRAIN JACKSON PRATT RESERVOIR 100ML SU130-1305

## (undated) DEVICE — STRAP KNEE/BODY 31143004

## (undated) DEVICE — GLOVE PROTEXIS POWDER FREE 7.5 ORTHOPEDIC 2D73ET75

## (undated) DEVICE — DEVICE ENDO STITCH APPLIER 10MM 173016

## (undated) DEVICE — SOL WATER IRRIG 500ML BOTTLE 2F7113

## (undated) DEVICE — SU VICRYL 4-0 PS-2 18" UND J496H

## (undated) DEVICE — SU DERMABOND PRINEO CLR602US

## (undated) DEVICE — PANTIES MESH LG/XLG 2PK 706M2

## (undated) DEVICE — NDL EPIDURAL TUOHY 20GA 3.5" 405028

## (undated) RX ORDER — OXYCODONE HYDROCHLORIDE 5 MG/1
TABLET ORAL
Status: DISPENSED
Start: 2019-02-21

## (undated) RX ORDER — PROPOFOL 10 MG/ML
INJECTION, EMULSION INTRAVENOUS
Status: DISPENSED
Start: 2019-02-21

## (undated) RX ORDER — ONDANSETRON 4 MG/1
TABLET, ORALLY DISINTEGRATING ORAL
Status: DISPENSED
Start: 2019-02-21

## (undated) RX ORDER — FENTANYL CITRATE 50 UG/ML
INJECTION, SOLUTION INTRAMUSCULAR; INTRAVENOUS
Status: DISPENSED
Start: 2019-02-21

## (undated) RX ORDER — MINERAL OIL
OIL (ML) MISCELLANEOUS
Status: DISPENSED
Start: 2019-02-21

## (undated) RX ORDER — ONDANSETRON 2 MG/ML
INJECTION INTRAMUSCULAR; INTRAVENOUS
Status: DISPENSED
Start: 2019-02-21

## (undated) RX ORDER — FENTANYL CITRATE 50 UG/ML
INJECTION, SOLUTION INTRAMUSCULAR; INTRAVENOUS
Status: DISPENSED
Start: 2020-08-24

## (undated) RX ORDER — CEFAZOLIN SODIUM 2 G/100ML
INJECTION, SOLUTION INTRAVENOUS
Status: DISPENSED
Start: 2020-08-24

## (undated) RX ORDER — DEXAMETHASONE SODIUM PHOSPHATE 4 MG/ML
INJECTION, SOLUTION INTRA-ARTICULAR; INTRALESIONAL; INTRAMUSCULAR; INTRAVENOUS; SOFT TISSUE
Status: DISPENSED
Start: 2019-02-21

## (undated) RX ORDER — HYDROMORPHONE HYDROCHLORIDE 1 MG/ML
INJECTION, SOLUTION INTRAMUSCULAR; INTRAVENOUS; SUBCUTANEOUS
Status: DISPENSED
Start: 2020-08-24

## (undated) RX ORDER — ONDANSETRON 2 MG/ML
INJECTION INTRAMUSCULAR; INTRAVENOUS
Status: DISPENSED
Start: 2020-08-24

## (undated) RX ORDER — CEFAZOLIN SODIUM 2 G/50ML
SOLUTION INTRAVENOUS
Status: DISPENSED
Start: 2019-02-21

## (undated) RX ORDER — GABAPENTIN 300 MG/1
CAPSULE ORAL
Status: DISPENSED
Start: 2019-02-21

## (undated) RX ORDER — SCOLOPAMINE TRANSDERMAL SYSTEM 1 MG/1
PATCH, EXTENDED RELEASE TRANSDERMAL
Status: DISPENSED
Start: 2019-02-21

## (undated) RX ORDER — ACETAMINOPHEN 325 MG/1
TABLET ORAL
Status: DISPENSED
Start: 2019-02-21

## (undated) RX ORDER — EPINEPHRINE 1 MG/ML
INJECTION, SOLUTION, CONCENTRATE INTRAVENOUS
Status: DISPENSED
Start: 2019-02-21